# Patient Record
Sex: FEMALE | Race: WHITE | NOT HISPANIC OR LATINO | URBAN - METROPOLITAN AREA
[De-identification: names, ages, dates, MRNs, and addresses within clinical notes are randomized per-mention and may not be internally consistent; named-entity substitution may affect disease eponyms.]

---

## 2017-06-08 ENCOUNTER — INPATIENT (INPATIENT)
Facility: HOSPITAL | Age: 77
LOS: 0 days | Discharge: HOME | End: 2017-06-09
Attending: INTERNAL MEDICINE | Admitting: INTERNAL MEDICINE
Payer: MEDICARE

## 2017-06-08 VITALS
RESPIRATION RATE: 16 BRPM | DIASTOLIC BLOOD PRESSURE: 93 MMHG | WEIGHT: 115.96 LBS | TEMPERATURE: 98 F | OXYGEN SATURATION: 98 % | SYSTOLIC BLOOD PRESSURE: 150 MMHG | HEART RATE: 109 BPM

## 2017-06-08 PROCEDURE — 93010 ELECTROCARDIOGRAM REPORT: CPT

## 2017-06-08 RX ORDER — SODIUM CHLORIDE 9 MG/ML
500 INJECTION INTRAMUSCULAR; INTRAVENOUS; SUBCUTANEOUS ONCE
Qty: 0 | Refills: 0 | Status: COMPLETED | OUTPATIENT
Start: 2017-06-08 | End: 2017-06-08

## 2017-06-08 NOTE — ED PROVIDER NOTE - HEME LYMPH, MLM
No adenopathy or splenomegaly. No cervical or inguinal lymphadenopathy. No adenopathy or splenomegaly.

## 2017-06-08 NOTE — ED PROVIDER NOTE - NS ED MD SCRIBE ATTENDING SCRIBE SECTIONS
REVIEW OF SYSTEMS/PROGRESS NOTE/RESULTS/INTAKE ASSESSMENT/SCREENINGS/PAST MEDICAL/SURGICAL/SOCIAL HISTORY/PHYSICAL EXAM/DISPOSITION

## 2017-06-08 NOTE — ED PROVIDER NOTE - OBJECTIVE STATEMENT
75 y/o female pt  w/ pmhx of HTN and HLD presents to the ED by EMS c/o of radiating pain from back to chest pain starting today. Pt reports taking 4 baby ASA and received nitroglycerin from the ambulence. Pt smokes 1 pack per day. 75 y/o female pt  w/ pmhx of HTN and HLD presents to the ED by EMS c/o of radiating pain from back to chest pain starting today. Pt reports taking 4 baby ASA and received nitroglycerin from the ambulance. Pt smokes 1 pack per day.

## 2017-06-09 VITALS
SYSTOLIC BLOOD PRESSURE: 138 MMHG | HEART RATE: 73 BPM | DIASTOLIC BLOOD PRESSURE: 76 MMHG | RESPIRATION RATE: 20 BRPM | TEMPERATURE: 98 F | OXYGEN SATURATION: 98 %

## 2017-06-09 DIAGNOSIS — Z90.49 ACQUIRED ABSENCE OF OTHER SPECIFIED PARTS OF DIGESTIVE TRACT: Chronic | ICD-10-CM

## 2017-06-09 LAB
ALBUMIN SERPL ELPH-MCNC: 3.6 G/DL — SIGNIFICANT CHANGE UP (ref 3.3–5)
ALP SERPL-CCNC: 65 U/L — SIGNIFICANT CHANGE UP (ref 40–120)
ALT FLD-CCNC: 19 U/L — SIGNIFICANT CHANGE UP (ref 12–78)
ANION GAP SERPL CALC-SCNC: 5 MMOL/L — SIGNIFICANT CHANGE UP (ref 5–17)
ANION GAP SERPL CALC-SCNC: 6 MMOL/L — SIGNIFICANT CHANGE UP (ref 5–17)
APPEARANCE UR: CLEAR — SIGNIFICANT CHANGE UP
APTT BLD: 24.4 SEC — LOW (ref 27.5–37.4)
AST SERPL-CCNC: 16 U/L — SIGNIFICANT CHANGE UP (ref 15–37)
BACTERIA # UR AUTO: (no result)
BASOPHILS # BLD AUTO: 0 K/UL — SIGNIFICANT CHANGE UP (ref 0–0.2)
BASOPHILS NFR BLD AUTO: 0.7 % — SIGNIFICANT CHANGE UP (ref 0–2)
BILIRUB SERPL-MCNC: 0.3 MG/DL — SIGNIFICANT CHANGE UP (ref 0.2–1.2)
BILIRUB UR-MCNC: NEGATIVE — SIGNIFICANT CHANGE UP
BUN SERPL-MCNC: 17 MG/DL — SIGNIFICANT CHANGE UP (ref 7–23)
BUN SERPL-MCNC: 24 MG/DL — HIGH (ref 7–23)
CALCIUM SERPL-MCNC: 8.4 MG/DL — LOW (ref 8.5–10.1)
CALCIUM SERPL-MCNC: 8.5 MG/DL — SIGNIFICANT CHANGE UP (ref 8.5–10.1)
CHLORIDE SERPL-SCNC: 109 MMOL/L — HIGH (ref 96–108)
CHLORIDE SERPL-SCNC: 111 MMOL/L — HIGH (ref 96–108)
CO2 SERPL-SCNC: 27 MMOL/L — SIGNIFICANT CHANGE UP (ref 22–31)
CO2 SERPL-SCNC: 27 MMOL/L — SIGNIFICANT CHANGE UP (ref 22–31)
COLOR SPEC: YELLOW — SIGNIFICANT CHANGE UP
COMMENT - URINE: SIGNIFICANT CHANGE UP
CREAT SERPL-MCNC: 0.71 MG/DL — SIGNIFICANT CHANGE UP (ref 0.5–1.3)
CREAT SERPL-MCNC: 0.86 MG/DL — SIGNIFICANT CHANGE UP (ref 0.5–1.3)
DIFF PNL FLD: NEGATIVE — SIGNIFICANT CHANGE UP
EOSINOPHIL # BLD AUTO: 0.1 K/UL — SIGNIFICANT CHANGE UP (ref 0–0.5)
EOSINOPHIL NFR BLD AUTO: 1.5 % — SIGNIFICANT CHANGE UP (ref 0–6)
EPI CELLS # UR: SIGNIFICANT CHANGE UP
GLUCOSE SERPL-MCNC: 104 MG/DL — HIGH (ref 70–99)
GLUCOSE SERPL-MCNC: 97 MG/DL — SIGNIFICANT CHANGE UP (ref 70–99)
GLUCOSE UR QL: NEGATIVE MG/DL — SIGNIFICANT CHANGE UP
HCT VFR BLD CALC: 34.9 % — SIGNIFICANT CHANGE UP (ref 34.5–45)
HGB BLD-MCNC: 11.7 G/DL — SIGNIFICANT CHANGE UP (ref 11.5–15.5)
INR BLD: 0.92 RATIO — SIGNIFICANT CHANGE UP (ref 0.88–1.16)
KETONES UR-MCNC: NEGATIVE — SIGNIFICANT CHANGE UP
LEUKOCYTE ESTERASE UR-ACNC: (no result)
LYMPHOCYTES # BLD AUTO: 1.7 K/UL — SIGNIFICANT CHANGE UP (ref 1–3.3)
LYMPHOCYTES # BLD AUTO: 26.8 % — SIGNIFICANT CHANGE UP (ref 13–44)
MCHC RBC-ENTMCNC: 33.2 PG — SIGNIFICANT CHANGE UP (ref 27–34)
MCHC RBC-ENTMCNC: 33.6 GM/DL — SIGNIFICANT CHANGE UP (ref 32–36)
MCV RBC AUTO: 98.9 FL — SIGNIFICANT CHANGE UP (ref 80–100)
MONOCYTES # BLD AUTO: 0.7 K/UL — SIGNIFICANT CHANGE UP (ref 0–0.9)
MONOCYTES NFR BLD AUTO: 10.6 % — SIGNIFICANT CHANGE UP (ref 2–14)
NEUTROPHILS # BLD AUTO: 3.9 K/UL — SIGNIFICANT CHANGE UP (ref 1.8–7.4)
NEUTROPHILS NFR BLD AUTO: 60.4 % — SIGNIFICANT CHANGE UP (ref 43–77)
NITRITE UR-MCNC: NEGATIVE — SIGNIFICANT CHANGE UP
PH UR: 5 — SIGNIFICANT CHANGE UP (ref 5–8)
PLATELET # BLD AUTO: 187 K/UL — SIGNIFICANT CHANGE UP (ref 150–400)
POTASSIUM SERPL-MCNC: 3.7 MMOL/L — SIGNIFICANT CHANGE UP (ref 3.5–5.3)
POTASSIUM SERPL-MCNC: 3.7 MMOL/L — SIGNIFICANT CHANGE UP (ref 3.5–5.3)
POTASSIUM SERPL-SCNC: 3.7 MMOL/L — SIGNIFICANT CHANGE UP (ref 3.5–5.3)
POTASSIUM SERPL-SCNC: 3.7 MMOL/L — SIGNIFICANT CHANGE UP (ref 3.5–5.3)
PROT SERPL-MCNC: 6.7 GM/DL — SIGNIFICANT CHANGE UP (ref 6–8.3)
PROT UR-MCNC: 15 MG/DL
PROTHROM AB SERPL-ACNC: 9.9 SEC — SIGNIFICANT CHANGE UP (ref 9.8–12.7)
RBC # BLD: 3.53 M/UL — LOW (ref 3.8–5.2)
RBC # FLD: 11.9 % — SIGNIFICANT CHANGE UP (ref 10.3–14.5)
RBC CASTS # UR COMP ASSIST: SIGNIFICANT CHANGE UP /HPF (ref 0–4)
SODIUM SERPL-SCNC: 142 MMOL/L — SIGNIFICANT CHANGE UP (ref 135–145)
SODIUM SERPL-SCNC: 143 MMOL/L — SIGNIFICANT CHANGE UP (ref 135–145)
SP GR SPEC: 1.01 — SIGNIFICANT CHANGE UP (ref 1.01–1.02)
TROPONIN I SERPL-MCNC: <0.015 NG/ML — SIGNIFICANT CHANGE UP (ref 0.01–0.04)
UROBILINOGEN FLD QL: NEGATIVE MG/DL — SIGNIFICANT CHANGE UP
WBC # BLD: 6.4 K/UL — SIGNIFICANT CHANGE UP (ref 3.8–10.5)
WBC # FLD AUTO: 6.4 K/UL — SIGNIFICANT CHANGE UP (ref 3.8–10.5)
WBC UR QL: (no result)

## 2017-06-09 PROCEDURE — 99285 EMERGENCY DEPT VISIT HI MDM: CPT

## 2017-06-09 PROCEDURE — 71010: CPT | Mod: 26

## 2017-06-09 PROCEDURE — 76856 US EXAM PELVIC COMPLETE: CPT | Mod: 26

## 2017-06-09 PROCEDURE — 74174 CTA ABD&PLVS W/CONTRAST: CPT | Mod: 26

## 2017-06-09 PROCEDURE — 71275 CT ANGIOGRAPHY CHEST: CPT | Mod: 26

## 2017-06-09 RX ORDER — ASPIRIN/CALCIUM CARB/MAGNESIUM 324 MG
325 TABLET ORAL DAILY
Qty: 0 | Refills: 0 | Status: DISCONTINUED | OUTPATIENT
Start: 2017-06-09 | End: 2017-06-09

## 2017-06-09 RX ORDER — ACETAMINOPHEN 500 MG
650 TABLET ORAL EVERY 6 HOURS
Qty: 0 | Refills: 0 | Status: DISCONTINUED | OUTPATIENT
Start: 2017-06-09 | End: 2017-06-09

## 2017-06-09 RX ORDER — METOPROLOL TARTRATE 50 MG
50 TABLET ORAL EVERY 12 HOURS
Qty: 0 | Refills: 0 | Status: DISCONTINUED | OUTPATIENT
Start: 2017-06-09 | End: 2017-06-09

## 2017-06-09 RX ORDER — LEVOTHYROXINE SODIUM 125 MCG
100 TABLET ORAL DAILY
Qty: 0 | Refills: 0 | Status: DISCONTINUED | OUTPATIENT
Start: 2017-06-09 | End: 2017-06-09

## 2017-06-09 RX ORDER — ENOXAPARIN SODIUM 100 MG/ML
40 INJECTION SUBCUTANEOUS EVERY 24 HOURS
Qty: 0 | Refills: 0 | Status: DISCONTINUED | OUTPATIENT
Start: 2017-06-09 | End: 2017-06-09

## 2017-06-09 RX ORDER — ONDANSETRON 8 MG/1
4 TABLET, FILM COATED ORAL EVERY 6 HOURS
Qty: 0 | Refills: 0 | Status: DISCONTINUED | OUTPATIENT
Start: 2017-06-09 | End: 2017-06-09

## 2017-06-09 RX ADMIN — SODIUM CHLORIDE 500 MILLILITER(S): 9 INJECTION INTRAMUSCULAR; INTRAVENOUS; SUBCUTANEOUS at 00:50

## 2017-06-09 RX ADMIN — Medication 100 MICROGRAM(S): at 09:27

## 2017-06-09 RX ADMIN — Medication 50 MILLIGRAM(S): at 09:27

## 2017-06-09 RX ADMIN — Medication 325 MILLIGRAM(S): at 13:50

## 2017-06-09 NOTE — ED ADULT NURSE REASSESSMENT NOTE - NS ED NURSE REASSESS COMMENT FT1
received pt from DANTE Wade, resting comfortably in bed with rails up, cardiac monitor in place, VSS. Pt denies CP or SOB at this time. Pending admission orders. safety maintained, will continue to monitor.

## 2017-06-09 NOTE — H&P ADULT - NSHPLABSRESULTS_GEN_ALL_CORE
11.7   6.4   )-----------( 187      ( 2017 00:15 )             34.9     2017 00:15    142    |  109    |  24     ----------------------------<  97     3.7     |  27     |  0.86     Ca    8.5        2017 00:15    TPro  6.7    /  Alb  3.6    /  TBili  0.3    /  DBili  x      /  AST  16     /  ALT  19     /  AlkPhos  65     2017 00:15    LIVER FUNCTIONS - ( 2017 00:15 )  Alb: 3.6 g/dL / Pro: 6.7 gm/dL / ALK PHOS: 65 U/L / ALT: 19 U/L / AST: 16 U/L / GGT: x           PT/INR - ( 2017 00:15 )   PT: 9.9 sec;   INR: 0.92 ratio         PTT - ( 2017 00:15 )  PTT:24.4 sec  CAPILLARY BLOOD GLUCOSE    CARDIAC MARKERS ( 2017 02:43 )  <0.015 ng/mL / x     / x     / x     / x      CARDIAC MARKERS ( 2017 00:15 )  <0.015 ng/mL / x     / x     / x     / x          Urinalysis Basic - ( 2017 04:48 )    Color: Yellow / Appearance: Clear / S.010 / pH: x  Gluc: x / Ketone: Negative  / Bili: Negative / Urobili: Negative mg/dL   Blood: x / Protein: 15 mg/dL / Nitrite: Negative   Leuk Esterase: Moderate / RBC: 0-2 /HPF / WBC 26-50   Sq Epi: x / Non Sq Epi: Few / Bacteria: Few    EKG: SR at 90, occassional PVCs

## 2017-06-09 NOTE — DISCHARGE NOTE ADULT - CARE PROVIDER_API CALL
Adam Carlson), Cardiology; Internal Medicine  [address] [place] [address]  Phone: [phone]  Fax: [phone]    Teresa Myers), Internal Medicine  [address] [place] [address]  Phone: [phone]  Fax: [phone]

## 2017-06-09 NOTE — DISCHARGE NOTE ADULT - PLAN OF CARE
follow up with DR Carlson for outpatient cardiac work up. Follow up with your PMD for right adnexal cystic lesion. 2 gm Na diet

## 2017-06-09 NOTE — ED ADULT NURSE REASSESSMENT NOTE - NS ED NURSE REASSESS COMMENT FT1
Rec'd call from Dr. Alexis, pt may be dc'd if she's cp free and has 2 or 3 neg tropes.  Updated pt.  Notified Dr. Watkins who stated to obtain 3rd trop now.

## 2017-06-09 NOTE — H&P ADULT - ASSESSMENT
76 year old female with H/O HTN, hypothyroidism, anxiety, ?right lung nodule resection due to cancer presented to ED with chest pain. As per patient, her pain started in the back and then radiated to the anterior chest, 10/10 intensity. Patient then took 4 baby aspirin. She also received SL nitro by EMS. She denies any sob, nausea, vomiting, fever or chills. No prior similar episodes.      1. Chest pain-  R/O ACS  Admit to tele  2 sets of cardiac enzymes are negative  Check another sets of CE  Continue aspirin  Consult cardiology  Consider inpatient stress test.      2. HTN-stable  Continue lopressor-patient cannot remember dose    3. Right adnexal cystic lesion-  Check pelvic US    4. Depression-  Continue zoloft-cannot remember dose.

## 2017-06-09 NOTE — DISCHARGE NOTE ADULT - HOSPITAL COURSE
76 year old female with H/O HTN, hypothyroidism, anxiety, ?right lung nodule resection due to cancer presented to ED with chest pain. As per patient, her pain started in the back and then radiated to the anterior chest, 10/10 intensity. Patient then took 4 baby aspirin. She also received SL nitro by EMS. She denies any sob, nausea, vomiting, fever or chills. No prior similar episodes.      Patient denies any more chest pain. She was notified regarding right adnexal lesion to follow up with her PMD. 3 sets of CE are negative. Outpatient cardiac follow up    Physical Exam:  Physical Exam: Vital Signs Last 24 Hrs T(C): 37.2, Max: 37.2 (06-09 @ 04:51) T(F): 98.9, Max: 98.9 (06-09 @ 04:51) HR: 83 (83 - 109) BP: 152/80 (136/78 - 152/80) BP(mean): -- RR: 16 (16 - 16) SpO2: 99% (98% - 99%)      · CONSTITUTIONAL: Well appearing, well nourished, awake, alert, oriented to person, place, time/situation and in no apparent distress. · ENMT: Airway patent, Nasal mucosa clear. Mouth with normal mucosa. Throat has no vesicles, no oropharyngeal exudates and uvula is midline. · HEAD: Head atraumatic, normal cephalic shape. · HEAD: Head is atraumatic. Head shape is symmetrical. · EYES: Clear bilaterally, pupils equal, round and reactive to light. · CARDIAC: Normal rate, regular rhythm.  Heart sounds S1, S2.  No murmurs, rubs or gallops. · RESPIRATORY: Breath sounds clear and equal bilaterally. · GASTROINTESTINAL: Abdomen soft, non-tender, no guarding. · MUSCULOSKELETAL: Spine appears normal, range of motion is not limited, no muscle or joint tenderness · NEUROLOGICAL: Alert and oriented, no focal deficits, no motor or sensory deficits. · SKIN: Skin normal color for race, warm, dry and intact. No evidence of rash. · PSYCHIATRIC: Alert and oriented to person, place, time/situation. normal mood and affect. no apparent risk to self or others. · HEME LYMPH: No adenopathy or splenomegaly.	        1. Chest pain-  Ruled out ACS  3 sets of cardiac enzymes are negative  Consult cardiology- RN discussed with Dr Carlson, as per him can be discharged home once 3 sets are negative  Out patient cardiac work up      2. HTN-stable  Continue lopressor    3. Right adnexal cystic lesion-  Pelvic US: right adnexal cystic lesion  Follow up with your PMD

## 2017-06-09 NOTE — ED ADULT NURSE REASSESSMENT NOTE - NS ED NURSE REASSESS COMMENT FT1
pt ambulated to restroom with assist. cardiac monitoring remains in place. safety maintained, will continue to monitor.

## 2017-06-09 NOTE — ED ADULT NURSE REASSESSMENT NOTE - NS ED NURSE REASSESS COMMENT FT1
Pt is an A&O x 4 female presents to ED c/o CP x 30 minutes PTA which started in her back and radiated to chest. EKG completed. Placed on cardiac monitor. SL initiated, labs drawn. IVF infusing. Awaiting CT scan at this time. Will continue to monitor.

## 2017-06-09 NOTE — DISCHARGE NOTE ADULT - MEDICATION SUMMARY - MEDICATIONS TO TAKE
I will START or STAY ON the medications listed below when I get home from the hospital:    Zoloft  --  by mouth   -- Indication: For anxiety    hydroCHLOROthiazide-losartan  --  by mouth   -- Indication: For HTN (hypertension)    metoprolol  --  by mouth   -- Indication: For HTN (hypertension)    Synthroid  --  by mouth   -- Indication: For Hypothyroidism, unspecified type

## 2017-06-09 NOTE — DISCHARGE NOTE ADULT - CARE PLAN
Principal Discharge DX:	Chest pain, unspecified type  Goal:	follow up with DR Carlson for outpatient cardiac work up. Follow up with your PMD for right adnexal cystic lesion.  Instructions for follow-up, activity and diet:	2 gm Na diet

## 2017-06-09 NOTE — DISCHARGE NOTE ADULT - PATIENT PORTAL LINK FT
“You can access the Follow[facility] Patient Portal, offered by [facility], by registering with the following website: [email or web address]”

## 2017-06-09 NOTE — DISCHARGE NOTE ADULT - CARE PROVIDERS DIRECT ADDRESSES
[email or web address]

## 2017-06-09 NOTE — ED ADULT NURSE REASSESSMENT NOTE - NS ED NURSE REASSESS COMMENT FT1
Updated pt and , just spoke via phone w Dr. Watkins, 3rd trop to be drawn & [place] to be consulted then dc to be considered.

## 2017-06-09 NOTE — H&P ADULT - HISTORY OF PRESENT ILLNESS
76 year old female with H/O HTN, hypothyroidism, anxiety, ?right lung nodule resection due to cancer presented to ED with chest pain. As per patient, her pain started in the back and then radiated to the anterior chest, 10/10 intensity. Patient then took 4 baby aspirin. She also received SL nitro by EMS. She denies any sob, nausea, vomiting, fever or chills. No prior similar episodes.

## 2017-06-09 NOTE — H&P ADULT - NSHPPHYSICALEXAM_GEN_ALL_CORE
Vital Signs Last 24 Hrs  T(C): 37.2, Max: 37.2 (06-09 @ 04:51)  T(F): 98.9, Max: 98.9 (06-09 @ 04:51)  HR: 83 (83 - 109)  BP: 152/80 (136/78 - 152/80)  BP(mean): --  RR: 16 (16 - 16)  SpO2: 99% (98% - 99%)            · CONSTITUTIONAL: Well appearing, well nourished, awake, alert, oriented to person, place, time/situation and in no apparent distress.  · ENMT: Airway patent, Nasal mucosa clear. Mouth with normal mucosa. Throat has no vesicles, no oropharyngeal exudates and uvula is midline.  · HEAD: Head atraumatic, normal cephalic shape.  · HEAD: Head is atraumatic. Head shape is symmetrical.  · EYES: Clear bilaterally, pupils equal, round and reactive to light.  · CARDIAC: Normal rate, regular rhythm.  Heart sounds S1, S2.  No murmurs, rubs or gallops.  · RESPIRATORY: Breath sounds clear and equal bilaterally.  · GASTROINTESTINAL: Abdomen soft, non-tender, no guarding.  · MUSCULOSKELETAL: Spine appears normal, range of motion is not limited, no muscle or joint tenderness  · NEUROLOGICAL: Alert and oriented, no focal deficits, no motor or sensory deficits.  · SKIN: Skin normal color for race, warm, dry and intact. No evidence of rash.  · PSYCHIATRIC: Alert and oriented to person, place, time/situation. normal mood and affect. no apparent risk to self or others.  · HEME LYMPH: No adenopathy or splenomegaly.

## 2017-06-10 ENCOUNTER — EMERGENCY (EMERGENCY)
Facility: HOSPITAL | Age: 77
LOS: 0 days | Discharge: HOME | End: 2017-06-11
Attending: EMERGENCY MEDICINE | Admitting: EMERGENCY MEDICINE
Payer: MEDICARE

## 2017-06-10 VITALS
HEIGHT: 63 IN | DIASTOLIC BLOOD PRESSURE: 95 MMHG | OXYGEN SATURATION: 100 % | TEMPERATURE: 98 F | RESPIRATION RATE: 18 BRPM | HEART RATE: 106 BPM | WEIGHT: 115.08 LBS | SYSTOLIC BLOOD PRESSURE: 156 MMHG
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
## 2017-06-10 DIAGNOSIS — S02.80XA FRACTURE OF OTHER SPECIFIED SKULL AND FACIAL BONES, UNSPECIFIED SIDE, INITIAL ENCOUNTER FOR CLOSED FRACTURE: ICD-10-CM

## 2017-06-10 DIAGNOSIS — Z90.49 ACQUIRED ABSENCE OF OTHER SPECIFIED PARTS OF DIGESTIVE TRACT: Chronic | ICD-10-CM

## 2017-06-10 DIAGNOSIS — E78.5 HYPERLIPIDEMIA, UNSPECIFIED: ICD-10-CM

## 2017-06-10 DIAGNOSIS — S01.412A LACERATION WITHOUT FOREIGN BODY OF LEFT CHEEK AND TEMPOROMANDIBULAR AREA, INITIAL ENCOUNTER: ICD-10-CM
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
## 2017-06-10 DIAGNOSIS — S01.511A LACERATION WITHOUT FOREIGN BODY OF LIP, INITIAL ENCOUNTER: ICD-10-CM

## 2017-06-10 DIAGNOSIS — I10 ESSENTIAL (PRIMARY) HYPERTENSION: ICD-10-CM

## 2017-06-10 DIAGNOSIS — S09.90XA UNSPECIFIED INJURY OF HEAD, INITIAL ENCOUNTER: ICD-10-CM

## 2017-06-10 LAB
ALBUMIN SERPL ELPH-MCNC: 4 G/DL — SIGNIFICANT CHANGE UP (ref 3.3–5)
ALP SERPL-CCNC: 66 U/L — SIGNIFICANT CHANGE UP (ref 40–120)
ALT FLD-CCNC: 25 U/L — SIGNIFICANT CHANGE UP (ref 12–78)
ANION GAP SERPL CALC-SCNC: 9 MMOL/L — SIGNIFICANT CHANGE UP (ref 5–17)
APTT BLD: 22.2 SEC — LOW (ref 27.5–37.4)
AST SERPL-CCNC: 19 U/L — SIGNIFICANT CHANGE UP (ref 15–37)
BASOPHILS # BLD AUTO: 0.1 K/UL — SIGNIFICANT CHANGE UP (ref 0–0.2)
BASOPHILS NFR BLD AUTO: 0.7 % — SIGNIFICANT CHANGE UP (ref 0–2)
BILIRUB SERPL-MCNC: 0.3 MG/DL — SIGNIFICANT CHANGE UP (ref 0.2–1.2)
BUN SERPL-MCNC: 20 MG/DL — SIGNIFICANT CHANGE UP (ref 7–23)
CALCIUM SERPL-MCNC: 9 MG/DL — SIGNIFICANT CHANGE UP (ref 8.5–10.1)
CHLORIDE SERPL-SCNC: 110 MMOL/L — HIGH (ref 96–108)
CO2 SERPL-SCNC: 27 MMOL/L — SIGNIFICANT CHANGE UP (ref 22–31)
CREAT SERPL-MCNC: 0.86 MG/DL — SIGNIFICANT CHANGE UP (ref 0.5–1.3)
CULTURE RESULTS: SIGNIFICANT CHANGE UP
EOSINOPHIL # BLD AUTO: 0.1 K/UL — SIGNIFICANT CHANGE UP (ref 0–0.5)
EOSINOPHIL NFR BLD AUTO: 1.5 % — SIGNIFICANT CHANGE UP (ref 0–6)
ETHANOL SERPL-MCNC: <10 MG/DL — SIGNIFICANT CHANGE UP (ref 0–10)
GLUCOSE SERPL-MCNC: 101 MG/DL — HIGH (ref 70–99)
HCT VFR BLD CALC: 35.4 % — SIGNIFICANT CHANGE UP (ref 34.5–45)
HGB BLD-MCNC: 12.6 G/DL — SIGNIFICANT CHANGE UP (ref 11.5–15.5)
INR BLD: 0.99 RATIO — SIGNIFICANT CHANGE UP (ref 0.88–1.16)
LYMPHOCYTES # BLD AUTO: 1.9 K/UL — SIGNIFICANT CHANGE UP (ref 1–3.3)
LYMPHOCYTES # BLD AUTO: 22.7 % — SIGNIFICANT CHANGE UP (ref 13–44)
MCHC RBC-ENTMCNC: 34.2 PG — HIGH (ref 27–34)
MCHC RBC-ENTMCNC: 35.4 GM/DL — SIGNIFICANT CHANGE UP (ref 32–36)
MCV RBC AUTO: 96.5 FL — SIGNIFICANT CHANGE UP (ref 80–100)
MONOCYTES # BLD AUTO: 0.7 K/UL — SIGNIFICANT CHANGE UP (ref 0–0.9)
MONOCYTES NFR BLD AUTO: 8 % — SIGNIFICANT CHANGE UP (ref 2–14)
NEUTROPHILS # BLD AUTO: 5.5 K/UL — SIGNIFICANT CHANGE UP (ref 1.8–7.4)
NEUTROPHILS NFR BLD AUTO: 67.1 % — SIGNIFICANT CHANGE UP (ref 43–77)
PLATELET # BLD AUTO: 198 K/UL — SIGNIFICANT CHANGE UP (ref 150–400)
POTASSIUM SERPL-MCNC: 4.1 MMOL/L — SIGNIFICANT CHANGE UP (ref 3.5–5.3)
POTASSIUM SERPL-SCNC: 4.1 MMOL/L — SIGNIFICANT CHANGE UP (ref 3.5–5.3)
PROT SERPL-MCNC: 7.4 GM/DL — SIGNIFICANT CHANGE UP (ref 6–8.3)
PROTHROM AB SERPL-ACNC: 10.7 SEC — SIGNIFICANT CHANGE UP (ref 9.8–12.7)
RBC # BLD: 3.67 M/UL — LOW (ref 3.8–5.2)
RBC # FLD: 11.8 % — SIGNIFICANT CHANGE UP (ref 10.3–14.5)
SODIUM SERPL-SCNC: 146 MMOL/L — HIGH (ref 135–145)
SPECIMEN SOURCE: SIGNIFICANT CHANGE UP
TROPONIN I SERPL-MCNC: <0.015 NG/ML — SIGNIFICANT CHANGE UP (ref 0.01–0.04)
WBC # BLD: 8.2 K/UL — SIGNIFICANT CHANGE UP (ref 3.8–10.5)
WBC # FLD AUTO: 8.2 K/UL — SIGNIFICANT CHANGE UP (ref 3.8–10.5)

## 2017-06-10 PROCEDURE — 70450 CT HEAD/BRAIN W/O DYE: CPT | Mod: 26

## 2017-06-10 PROCEDURE — 70486 CT MAXILLOFACIAL W/O DYE: CPT | Mod: 26

## 2017-06-10 PROCEDURE — 12011 RPR F/E/E/N/L/M 2.5 CM/<: CPT

## 2017-06-10 PROCEDURE — 72125 CT NECK SPINE W/O DYE: CPT | Mod: 26

## 2017-06-10 PROCEDURE — 71250 CT THORAX DX C-: CPT | Mod: 26

## 2017-06-10 PROCEDURE — 71010: CPT | Mod: 26

## 2017-06-10 PROCEDURE — 76377 3D RENDER W/INTRP POSTPROCES: CPT | Mod: 26

## 2017-06-10 PROCEDURE — 93010 ELECTROCARDIOGRAM REPORT: CPT

## 2017-06-10 PROCEDURE — 99285 EMERGENCY DEPT VISIT HI MDM: CPT | Mod: 25

## 2017-06-10 RX ORDER — MORPHINE SULFATE 50 MG/1
4 CAPSULE, EXTENDED RELEASE ORAL ONCE
Qty: 0 | Refills: 0 | Status: DISCONTINUED | OUTPATIENT
Start: 2017-06-10 | End: 2017-06-10

## 2017-06-10 RX ADMIN — MORPHINE SULFATE 4 MILLIGRAM(S): 50 CAPSULE, EXTENDED RELEASE ORAL at 21:35

## 2017-06-10 RX ADMIN — MORPHINE SULFATE 4 MILLIGRAM(S): 50 CAPSULE, EXTENDED RELEASE ORAL at 21:18

## 2017-06-10 NOTE — ED PROVIDER NOTE - NS ED MD SCRIBE ATTENDING SCRIBE SECTIONS
HISTORY OF PRESENT ILLNESS/REVIEW OF SYSTEMS/PHYSICAL EXAM/RESULTS/PAST MEDICAL/SURGICAL/SOCIAL HISTORY/DISPOSITION/PROGRESS NOTE

## 2017-06-10 NOTE — ED PROVIDER NOTE - CARE PLAN
Principal Discharge DX:	Facial fracture  Secondary Diagnosis:	Facial laceration  Secondary Diagnosis:	Chest wall pain

## 2017-06-10 NOTE — ED PROVIDER NOTE - MEDICAL DECISION MAKING DETAILS
patient unsure if fall was secondary to syncope, she remembers events leading to fall. patient with [place] syncope rule negative. appropriate for outpatient management of facial bone fractures.

## 2017-06-10 NOTE — ED PROVIDER NOTE - OBJECTIVE STATEMENT
77 y/o F with a h/o previous neck surgery, BIBA with C-collar in place, s/p fall from standing height in kitchen minutes PTA, striking right side of face on counter, c/o lacs to corner of right eye and corner or right lip. Pt also c/o right rib pain. Denies alcohol use.

## 2017-06-10 NOTE — ED PROVIDER NOTE - PROGRESS NOTE DETAILS
case discussed with Dr Pretty of plastic surgery, no acute surgical intervention for facial bone fractures, patient can f/u as an outpatient for facial bone fracture management. procedure note: right face laceration- betadine applied, I anesthetized the wound with 1% lido with epi, irrigated with sterile water. 5-0 prolene sutures used for wound closure simple interrupted. right lip laceration - betadine applied, anesthetized with 1% lidocaine with epi, 5- vicryl for wound closure simple interrupted. patient tolerated procedure well. procedure note: right face laceration- betadine applied, I anesthetized the wound with 1% lido with epi, irrigated with sterile water. 5-0 prolene sutures used for wound closure simple interrupted. right lip laceration - betadine applied, anesthetized with 1% lidocaine with epi, 5-0 vicryl for wound closure simple interrupted. patient tolerated procedure well.

## 2017-06-11 PROBLEM — I10 ESSENTIAL (PRIMARY) HYPERTENSION: Chronic | Status: ACTIVE | Noted: 2017-06-09

## 2017-06-16 DIAGNOSIS — F41.9 ANXIETY DISORDER, UNSPECIFIED: ICD-10-CM

## 2017-06-16 DIAGNOSIS — I10 ESSENTIAL (PRIMARY) HYPERTENSION: ICD-10-CM

## 2017-06-16 DIAGNOSIS — R91.1 SOLITARY PULMONARY NODULE: ICD-10-CM

## 2017-06-16 DIAGNOSIS — F32.9 MAJOR DEPRESSIVE DISORDER, SINGLE EPISODE, UNSPECIFIED: ICD-10-CM

## 2017-06-16 DIAGNOSIS — R07.9 CHEST PAIN, UNSPECIFIED: ICD-10-CM

## 2017-06-16 DIAGNOSIS — E03.9 HYPOTHYROIDISM, UNSPECIFIED: ICD-10-CM

## 2017-06-16 DIAGNOSIS — N83.201 UNSPECIFIED OVARIAN CYST, RIGHT SIDE: ICD-10-CM

## 2017-08-24 ENCOUNTER — OUTPATIENT (OUTPATIENT)
Dept: OUTPATIENT SERVICES | Facility: HOSPITAL | Age: 77
LOS: 1 days | Discharge: HOME | End: 2017-08-24
Payer: MEDICARE

## 2017-08-24 DIAGNOSIS — C34.81 MALIGNANT NEOPLASM OF OVERLAPPING SITES OF RIGHT BRONCHUS AND LUNG: ICD-10-CM

## 2017-08-24 DIAGNOSIS — C34.02 MALIGNANT NEOPLASM OF LEFT MAIN BRONCHUS: ICD-10-CM

## 2017-08-24 DIAGNOSIS — Z01.818 ENCOUNTER FOR OTHER PREPROCEDURAL EXAMINATION: ICD-10-CM

## 2017-08-24 DIAGNOSIS — Z90.49 ACQUIRED ABSENCE OF OTHER SPECIFIED PARTS OF DIGESTIVE TRACT: Chronic | ICD-10-CM

## 2017-08-24 DIAGNOSIS — Z98.890 OTHER SPECIFIED POSTPROCEDURAL STATES: Chronic | ICD-10-CM

## 2017-08-24 DIAGNOSIS — Z98.1 ARTHRODESIS STATUS: Chronic | ICD-10-CM

## 2017-08-24 DIAGNOSIS — E83.110 HEREDITARY HEMOCHROMATOSIS: ICD-10-CM

## 2017-08-24 DIAGNOSIS — C79.51 SECONDARY MALIGNANT NEOPLASM OF BONE: ICD-10-CM

## 2017-08-24 DIAGNOSIS — E03.9 HYPOTHYROIDISM, UNSPECIFIED: ICD-10-CM

## 2017-08-24 DIAGNOSIS — Z87.891 PERSONAL HISTORY OF NICOTINE DEPENDENCE: ICD-10-CM

## 2017-08-24 DIAGNOSIS — Z90.721 ACQUIRED ABSENCE OF OVARIES, UNILATERAL: Chronic | ICD-10-CM

## 2017-08-24 DIAGNOSIS — Z79.82 LONG TERM (CURRENT) USE OF ASPIRIN: ICD-10-CM

## 2017-08-24 DIAGNOSIS — I10 ESSENTIAL (PRIMARY) HYPERTENSION: ICD-10-CM

## 2017-08-24 LAB
ALBUMIN SERPL ELPH-MCNC: 3.7 G/DL — SIGNIFICANT CHANGE UP (ref 3.3–5)
ALP SERPL-CCNC: 91 U/L — SIGNIFICANT CHANGE UP (ref 40–120)
ALT FLD-CCNC: 13 U/L — SIGNIFICANT CHANGE UP (ref 12–78)
ANION GAP SERPL CALC-SCNC: 7 MMOL/L — SIGNIFICANT CHANGE UP (ref 5–17)
APPEARANCE UR: CLEAR — SIGNIFICANT CHANGE UP
AST SERPL-CCNC: 10 U/L — LOW (ref 15–37)
BACTERIA # UR AUTO: (no result)
BASOPHILS # BLD AUTO: 0 K/UL — SIGNIFICANT CHANGE UP (ref 0–0.2)
BASOPHILS NFR BLD AUTO: 0.6 % — SIGNIFICANT CHANGE UP (ref 0–2)
BILIRUB SERPL-MCNC: 0.4 MG/DL — SIGNIFICANT CHANGE UP (ref 0.2–1.2)
BILIRUB UR-MCNC: NEGATIVE — SIGNIFICANT CHANGE UP
BUN SERPL-MCNC: 18 MG/DL — SIGNIFICANT CHANGE UP (ref 7–23)
CALCIUM SERPL-MCNC: 9.2 MG/DL — SIGNIFICANT CHANGE UP (ref 8.5–10.1)
CHLORIDE SERPL-SCNC: 104 MMOL/L — SIGNIFICANT CHANGE UP (ref 96–108)
CO2 SERPL-SCNC: 28 MMOL/L — SIGNIFICANT CHANGE UP (ref 22–31)
COLOR SPEC: YELLOW — SIGNIFICANT CHANGE UP
CREAT SERPL-MCNC: 0.88 MG/DL — SIGNIFICANT CHANGE UP (ref 0.5–1.3)
DIFF PNL FLD: NEGATIVE — SIGNIFICANT CHANGE UP
EOSINOPHIL # BLD AUTO: 0.3 K/UL — SIGNIFICANT CHANGE UP (ref 0–0.5)
EOSINOPHIL NFR BLD AUTO: 4.4 % — SIGNIFICANT CHANGE UP (ref 0–6)
GLUCOSE SERPL-MCNC: 95 MG/DL — SIGNIFICANT CHANGE UP (ref 70–99)
GLUCOSE UR QL: NEGATIVE MG/DL — SIGNIFICANT CHANGE UP
HCT VFR BLD CALC: 34.4 % — LOW (ref 34.5–45)
HGB BLD-MCNC: 11.7 G/DL — SIGNIFICANT CHANGE UP (ref 11.5–15.5)
KETONES UR-MCNC: (no result)
LEUKOCYTE ESTERASE UR-ACNC: (no result)
LYMPHOCYTES # BLD AUTO: 1.8 K/UL — SIGNIFICANT CHANGE UP (ref 1–3.3)
LYMPHOCYTES # BLD AUTO: 25.4 % — SIGNIFICANT CHANGE UP (ref 13–44)
MCHC RBC-ENTMCNC: 33.3 PG — SIGNIFICANT CHANGE UP (ref 27–34)
MCHC RBC-ENTMCNC: 34 GM/DL — SIGNIFICANT CHANGE UP (ref 32–36)
MCV RBC AUTO: 97.9 FL — SIGNIFICANT CHANGE UP (ref 80–100)
MONOCYTES # BLD AUTO: 1 K/UL — HIGH (ref 0–0.9)
MONOCYTES NFR BLD AUTO: 14 % — SIGNIFICANT CHANGE UP (ref 2–14)
NEUTROPHILS # BLD AUTO: 3.9 K/UL — SIGNIFICANT CHANGE UP (ref 1.8–7.4)
NEUTROPHILS NFR BLD AUTO: 55.7 % — SIGNIFICANT CHANGE UP (ref 43–77)
NITRITE UR-MCNC: NEGATIVE — SIGNIFICANT CHANGE UP
PH UR: 5 — SIGNIFICANT CHANGE UP (ref 5–8)
PLATELET # BLD AUTO: 263 K/UL — SIGNIFICANT CHANGE UP (ref 150–400)
POTASSIUM SERPL-MCNC: 4 MMOL/L — SIGNIFICANT CHANGE UP (ref 3.5–5.3)
POTASSIUM SERPL-SCNC: 4 MMOL/L — SIGNIFICANT CHANGE UP (ref 3.5–5.3)
PROT SERPL-MCNC: 7.5 GM/DL — SIGNIFICANT CHANGE UP (ref 6–8.3)
PROT UR-MCNC: 15 MG/DL
RBC # BLD: 3.51 M/UL — LOW (ref 3.8–5.2)
RBC # FLD: 11.4 % — SIGNIFICANT CHANGE UP (ref 10.3–14.5)
RBC CASTS # UR COMP ASSIST: NEGATIVE /HPF — SIGNIFICANT CHANGE UP (ref 0–4)
SODIUM SERPL-SCNC: 139 MMOL/L — SIGNIFICANT CHANGE UP (ref 135–145)
SP GR SPEC: 1.01 — SIGNIFICANT CHANGE UP (ref 1.01–1.02)
UROBILINOGEN FLD QL: NEGATIVE MG/DL — SIGNIFICANT CHANGE UP
WBC # BLD: 7 K/UL — SIGNIFICANT CHANGE UP (ref 3.8–10.5)
WBC # FLD AUTO: 7 K/UL — SIGNIFICANT CHANGE UP (ref 3.8–10.5)
WBC UR QL: SIGNIFICANT CHANGE UP

## 2017-08-24 PROCEDURE — 93010 ELECTROCARDIOGRAM REPORT: CPT

## 2017-08-24 RX ORDER — SERTRALINE 25 MG/1
0 TABLET, FILM COATED ORAL
Qty: 0 | Refills: 0 | COMMUNITY

## 2017-08-24 RX ORDER — LOSARTAN/HYDROCHLOROTHIAZIDE 100MG-25MG
0 TABLET ORAL
Qty: 0 | Refills: 0 | COMMUNITY

## 2017-08-24 RX ORDER — LEVOTHYROXINE SODIUM 125 MCG
0 TABLET ORAL
Qty: 0 | Refills: 0 | COMMUNITY

## 2017-08-24 RX ORDER — METOPROLOL TARTRATE 50 MG
0 TABLET ORAL
Qty: 0 | Refills: 0 | COMMUNITY

## 2017-08-24 NOTE — CHART NOTE - NSCHARTNOTEFT_GEN_A_CORE
Plan  1. Stop all NSAIDS, herbal supplements and vitamins for 7 days.  2. NPO at midnight.  3. Take the following medications ( ) with small sips of water on the morning of your procedure/surgery.  4. Use EZ sponges as directed  5. Use mupirocin as directed Plan  1. Stop all NSAIDS, herbal supplements and vitamins for 7 days.  2. NPO at midnight.  3. Take the following medications (synthroid) with small sips of water on the morning of your procedure/surgery.  4. Use EZ sponges as directed  5. Use mupirocin as directed

## 2017-08-24 NOTE — ASU PATIENT PROFILE, ADULT - PSH
H/O oophorectomy    H/O spinal fusion  anterior cervical fusion 2x   15 yrs ago, 2014  H/O umbilical hernia repair    History of lung surgery  right lung nodules removed 2015  S/P breast lumpectomy H/O oophorectomy    H/O spinal fusion  anterior cervical fusion 2x   15 yrs ago, 2014  H/O umbilical hernia repair    History of facial surgery  right cheekbone surgery - 06/2017  History of lung surgery  right lung nodules removed 2015  S/P breast lumpectomy

## 2017-08-24 NOTE — ASU PATIENT PROFILE, ADULT - PMH
Hemochromatosis    HTN (hypertension)    HTN (hypertension)    Hypothyroidism    Hypothyroidism, unspecified type    Intervertebral disc disorder  S/P cervical fusion  Lung cancer    Macular degeneration  left eye  Osteoporosis HTN (hypertension)    Hypothyroidism, unspecified type    Intervertebral disc disorder  S/P cervical fusion  Lung cancer    Macular degeneration  left eye  Osteoporosis    Vitamin B12 deficiency

## 2017-08-25 LAB
MRSA PCR RESULT.: SIGNIFICANT CHANGE UP
S AUREUS DNA NOSE QL NAA+PROBE: SIGNIFICANT CHANGE UP

## 2017-09-15 ENCOUNTER — OUTPATIENT (OUTPATIENT)
Dept: OUTPATIENT SERVICES | Facility: HOSPITAL | Age: 77
LOS: 1 days | Discharge: HOME | End: 2017-09-15

## 2017-09-15 DIAGNOSIS — Z98.890 OTHER SPECIFIED POSTPROCEDURAL STATES: Chronic | ICD-10-CM

## 2017-09-15 DIAGNOSIS — C34.81 MALIGNANT NEOPLASM OF OVERLAPPING SITES OF RIGHT BRONCHUS AND LUNG: ICD-10-CM

## 2017-09-15 DIAGNOSIS — Z01.818 ENCOUNTER FOR OTHER PREPROCEDURAL EXAMINATION: ICD-10-CM

## 2017-09-15 DIAGNOSIS — Z98.1 ARTHRODESIS STATUS: Chronic | ICD-10-CM

## 2017-09-15 DIAGNOSIS — Z90.721 ACQUIRED ABSENCE OF OVARIES, UNILATERAL: Chronic | ICD-10-CM

## 2017-09-15 LAB
ALBUMIN SERPL ELPH-MCNC: 3.8 G/DL — SIGNIFICANT CHANGE UP (ref 3.3–5)
ALP SERPL-CCNC: 107 U/L — SIGNIFICANT CHANGE UP (ref 40–120)
ALT FLD-CCNC: 12 U/L — SIGNIFICANT CHANGE UP (ref 12–78)
ANION GAP SERPL CALC-SCNC: 5 MMOL/L — SIGNIFICANT CHANGE UP (ref 5–17)
APPEARANCE UR: CLEAR — SIGNIFICANT CHANGE UP
AST SERPL-CCNC: 15 U/L — SIGNIFICANT CHANGE UP (ref 15–37)
BACTERIA # UR AUTO: (no result)
BASOPHILS # BLD AUTO: 0 K/UL — SIGNIFICANT CHANGE UP (ref 0–0.2)
BASOPHILS NFR BLD AUTO: 0.4 % — SIGNIFICANT CHANGE UP (ref 0–2)
BILIRUB SERPL-MCNC: 0.4 MG/DL — SIGNIFICANT CHANGE UP (ref 0.2–1.2)
BILIRUB UR-MCNC: NEGATIVE — SIGNIFICANT CHANGE UP
BUN SERPL-MCNC: 15 MG/DL — SIGNIFICANT CHANGE UP (ref 7–23)
CALCIUM SERPL-MCNC: 9.2 MG/DL — SIGNIFICANT CHANGE UP (ref 8.5–10.1)
CHLORIDE SERPL-SCNC: 106 MMOL/L — SIGNIFICANT CHANGE UP (ref 96–108)
CO2 SERPL-SCNC: 30 MMOL/L — SIGNIFICANT CHANGE UP (ref 22–31)
COLOR SPEC: YELLOW — SIGNIFICANT CHANGE UP
CREAT SERPL-MCNC: 0.76 MG/DL — SIGNIFICANT CHANGE UP (ref 0.5–1.3)
DIFF PNL FLD: (no result)
EOSINOPHIL # BLD AUTO: 0.3 K/UL — SIGNIFICANT CHANGE UP (ref 0–0.5)
EOSINOPHIL NFR BLD AUTO: 4.5 % — SIGNIFICANT CHANGE UP (ref 0–6)
EPI CELLS # UR: SIGNIFICANT CHANGE UP
GLUCOSE SERPL-MCNC: 104 MG/DL — HIGH (ref 70–99)
GLUCOSE UR QL: NEGATIVE MG/DL — SIGNIFICANT CHANGE UP
HCT VFR BLD CALC: 35.4 % — SIGNIFICANT CHANGE UP (ref 34.5–45)
HGB BLD-MCNC: 12.4 G/DL — SIGNIFICANT CHANGE UP (ref 11.5–15.5)
HYALINE CASTS # UR AUTO: (no result) /LPF
KETONES UR-MCNC: NEGATIVE — SIGNIFICANT CHANGE UP
LEUKOCYTE ESTERASE UR-ACNC: (no result)
LYMPHOCYTES # BLD AUTO: 1.5 K/UL — SIGNIFICANT CHANGE UP (ref 1–3.3)
LYMPHOCYTES # BLD AUTO: 24.9 % — SIGNIFICANT CHANGE UP (ref 13–44)
MCHC RBC-ENTMCNC: 33.9 PG — SIGNIFICANT CHANGE UP (ref 27–34)
MCHC RBC-ENTMCNC: 35 GM/DL — SIGNIFICANT CHANGE UP (ref 32–36)
MCV RBC AUTO: 96.7 FL — SIGNIFICANT CHANGE UP (ref 80–100)
MONOCYTES # BLD AUTO: 0.8 K/UL — SIGNIFICANT CHANGE UP (ref 0–0.9)
MONOCYTES NFR BLD AUTO: 12.6 % — SIGNIFICANT CHANGE UP (ref 2–14)
MRSA PCR RESULT.: SIGNIFICANT CHANGE UP
NEUTROPHILS # BLD AUTO: 3.5 K/UL — SIGNIFICANT CHANGE UP (ref 1.8–7.4)
NEUTROPHILS NFR BLD AUTO: 57.6 % — SIGNIFICANT CHANGE UP (ref 43–77)
NITRITE UR-MCNC: POSITIVE
PH UR: 5 — SIGNIFICANT CHANGE UP (ref 5–8)
PLATELET # BLD AUTO: 182 K/UL — SIGNIFICANT CHANGE UP (ref 150–400)
POTASSIUM SERPL-MCNC: 4.1 MMOL/L — SIGNIFICANT CHANGE UP (ref 3.5–5.3)
POTASSIUM SERPL-SCNC: 4.1 MMOL/L — SIGNIFICANT CHANGE UP (ref 3.5–5.3)
PROT SERPL-MCNC: 7.7 GM/DL — SIGNIFICANT CHANGE UP (ref 6–8.3)
PROT UR-MCNC: 15 MG/DL
RBC # BLD: 3.66 M/UL — LOW (ref 3.8–5.2)
RBC # FLD: 11.3 % — SIGNIFICANT CHANGE UP (ref 10.3–14.5)
RBC CASTS # UR COMP ASSIST: NEGATIVE /HPF — SIGNIFICANT CHANGE UP (ref 0–4)
S AUREUS DNA NOSE QL NAA+PROBE: SIGNIFICANT CHANGE UP
SODIUM SERPL-SCNC: 141 MMOL/L — SIGNIFICANT CHANGE UP (ref 135–145)
SP GR SPEC: 1.02 — SIGNIFICANT CHANGE UP (ref 1.01–1.02)
UROBILINOGEN FLD QL: 1 MG/DL
WBC # BLD: 6.1 K/UL — SIGNIFICANT CHANGE UP (ref 3.8–10.5)
WBC # FLD AUTO: 6.1 K/UL — SIGNIFICANT CHANGE UP (ref 3.8–10.5)
WBC UR QL: SIGNIFICANT CHANGE UP

## 2017-09-15 RX ORDER — PREGABALIN 225 MG/1
1 CAPSULE ORAL
Qty: 0 | Refills: 0 | COMMUNITY

## 2017-09-15 NOTE — ASU PATIENT PROFILE, ADULT - PMH
HTN (hypertension)    Hypothyroidism, unspecified type    Intervertebral disc disorder  S/P cervical fusion  Lung cancer    Macular degeneration  left eye  Osteoporosis    Vitamin B12 deficiency

## 2017-09-15 NOTE — CHART NOTE - NSCHARTNOTEFT_GEN_A_CORE
Vital Signs:  Height 5' 3", weight 123 pounds (55.9 Kg), B/P 138/74, HR 72, Resp 16, TEmp 97.9F, O2 Sat 98% room air    Patient instructed on     1. NPO post midnight of surgery  2. On the use of EZ sponges  3. Mupirocin use  4. May take Synthroid with a sip of water on morning of procedure Vital Signs:  Height 5' 3", weight 123 pounds (55.9 Kg), B/P 138/74, HR 72, Resp 16, Temp 97.9F, O2 Sat 98% room air    Patient instructed on     1. NPO post midnight of surgery  2. On the use of EZ sponges  3. Mupirocin use  4. May take Synthroid with a sip of water on morning of procedure

## 2017-09-15 NOTE — ASU PATIENT PROFILE, ADULT - VISION (WITH CORRECTIVE LENSES IF THE PATIENT USUALLY WEARS THEM):
does wear glasses/Normal vision: sees adequately in most situations; can see medication labels, newsprint

## 2017-09-15 NOTE — ASU PATIENT PROFILE, ADULT - PSH
H/O oophorectomy  Right side  H/O spinal fusion  anterior cervical fusion 2x   15 yrs ago, 2014  H/O umbilical hernia repair    History of facial surgery  right cheekbone surgery - 06/2017  History of lung surgery  right lung nodules removed 2015  S/P breast lumpectomy

## 2017-09-20 RX ORDER — SODIUM CHLORIDE 9 MG/ML
1000 INJECTION, SOLUTION INTRAVENOUS
Qty: 0 | Refills: 0 | Status: DISCONTINUED | OUTPATIENT
Start: 2017-09-21 | End: 2017-09-21

## 2017-09-20 RX ORDER — OXYCODONE HYDROCHLORIDE 5 MG/1
5 TABLET ORAL EVERY 4 HOURS
Qty: 0 | Refills: 0 | Status: DISCONTINUED | OUTPATIENT
Start: 2017-09-21 | End: 2017-09-21

## 2017-09-21 ENCOUNTER — OUTPATIENT (OUTPATIENT)
Dept: OUTPATIENT SERVICES | Facility: HOSPITAL | Age: 77
LOS: 1 days | Discharge: HOME | End: 2017-09-21
Payer: MEDICARE

## 2017-09-21 VITALS
DIASTOLIC BLOOD PRESSURE: 68 MMHG | SYSTOLIC BLOOD PRESSURE: 150 MMHG | OXYGEN SATURATION: 98 % | HEART RATE: 66 BPM | RESPIRATION RATE: 18 BRPM | TEMPERATURE: 98 F

## 2017-09-21 VITALS
SYSTOLIC BLOOD PRESSURE: 127 MMHG | HEART RATE: 82 BPM | HEIGHT: 63 IN | RESPIRATION RATE: 16 BRPM | TEMPERATURE: 98 F | OXYGEN SATURATION: 95 % | WEIGHT: 121.92 LBS | DIASTOLIC BLOOD PRESSURE: 69 MMHG

## 2017-09-21 DIAGNOSIS — E03.9 HYPOTHYROIDISM, UNSPECIFIED: ICD-10-CM

## 2017-09-21 DIAGNOSIS — Z98.890 OTHER SPECIFIED POSTPROCEDURAL STATES: Chronic | ICD-10-CM

## 2017-09-21 DIAGNOSIS — Z79.82 LONG TERM (CURRENT) USE OF ASPIRIN: ICD-10-CM

## 2017-09-21 DIAGNOSIS — E83.110 HEREDITARY HEMOCHROMATOSIS: ICD-10-CM

## 2017-09-21 DIAGNOSIS — Z98.1 ARTHRODESIS STATUS: Chronic | ICD-10-CM

## 2017-09-21 DIAGNOSIS — C34.00 MALIGNANT NEOPLASM OF UNSPECIFIED MAIN BRONCHUS: ICD-10-CM

## 2017-09-21 DIAGNOSIS — Z87.891 PERSONAL HISTORY OF NICOTINE DEPENDENCE: ICD-10-CM

## 2017-09-21 DIAGNOSIS — Z90.721 ACQUIRED ABSENCE OF OVARIES, UNILATERAL: Chronic | ICD-10-CM

## 2017-09-21 DIAGNOSIS — C34.02 MALIGNANT NEOPLASM OF LEFT MAIN BRONCHUS: ICD-10-CM

## 2017-09-21 DIAGNOSIS — C79.51 SECONDARY MALIGNANT NEOPLASM OF BONE: ICD-10-CM

## 2017-09-21 DIAGNOSIS — I10 ESSENTIAL (PRIMARY) HYPERTENSION: ICD-10-CM

## 2017-09-21 PROCEDURE — 71010: CPT | Mod: 26

## 2017-09-21 RX ORDER — ONDANSETRON 8 MG/1
4 TABLET, FILM COATED ORAL ONCE
Qty: 0 | Refills: 0 | Status: DISCONTINUED | OUTPATIENT
Start: 2017-09-21 | End: 2017-09-21

## 2017-09-21 RX ORDER — OXYCODONE HYDROCHLORIDE 5 MG/1
5 TABLET ORAL EVERY 4 HOURS
Qty: 0 | Refills: 0 | Status: DISCONTINUED | OUTPATIENT
Start: 2017-09-21 | End: 2017-09-21

## 2017-09-21 RX ORDER — PREGABALIN 225 MG/1
0 CAPSULE ORAL
Qty: 0 | Refills: 0 | COMMUNITY

## 2017-09-21 RX ORDER — METOPROLOL TARTRATE 50 MG
1 TABLET ORAL
Qty: 0 | Refills: 0 | COMMUNITY

## 2017-09-21 RX ORDER — SERTRALINE 25 MG/1
1 TABLET, FILM COATED ORAL
Qty: 0 | Refills: 0 | COMMUNITY

## 2017-09-21 RX ORDER — SODIUM CHLORIDE 9 MG/ML
1000 INJECTION, SOLUTION INTRAVENOUS
Qty: 0 | Refills: 0 | Status: DISCONTINUED | OUTPATIENT
Start: 2017-09-21 | End: 2017-10-06

## 2017-09-21 RX ORDER — LEVOTHYROXINE SODIUM 125 MCG
1 TABLET ORAL
Qty: 0 | Refills: 0 | COMMUNITY

## 2017-09-21 RX ORDER — ACETAMINOPHEN 500 MG
650 TABLET ORAL EVERY 6 HOURS
Qty: 0 | Refills: 0 | Status: DISCONTINUED | OUTPATIENT
Start: 2017-09-21 | End: 2017-10-06

## 2017-09-21 RX ORDER — FENTANYL CITRATE 50 UG/ML
25 INJECTION INTRAVENOUS
Qty: 0 | Refills: 0 | Status: DISCONTINUED | OUTPATIENT
Start: 2017-09-21 | End: 2017-09-21

## 2017-09-21 RX ADMIN — SODIUM CHLORIDE 125 MILLILITER(S): 9 INJECTION, SOLUTION INTRAVENOUS at 08:09

## 2017-09-21 RX ADMIN — OXYCODONE HYDROCHLORIDE 5 MILLIGRAM(S): 5 TABLET ORAL at 08:36

## 2017-09-21 NOTE — ASU DISCHARGE PLAN (ADULT/PEDIATRIC). - NURSING INSTRUCTIONS
For any problems or concerns,contact your doctor. [facility] patients should call the [facility]. If you cannot reach the doctor or clinic, call [facility] at [phone] or go to your local Emergency Department.  A responsible adult should be with you for the rest of the day and night for your safety and to help you if you needed. Resume your medications as listed on the attached Medication Record. Begin with liquids and light food ( tea, toast, Jello, soups). Advance to what you normally eat. Liquids should taken in adequate amounts today.     CALL the DOCTOR:    -Fever greater than  101F  - Signs  of infection such as : increase pain,swelling,redness,or a bad  smell coming from the wound.  -Excessive amount of bleeding.  - Any pain that appears to be getting worse.  - Vomiting  -  If you have  not urinated 8 hours after surgery or have any difficulty urinating.     A responsible adult should be with you for the rest of the day and night for your safety and to help you if you needed.    Review attached FACT SHEET if applicable.

## 2017-09-21 NOTE — BRIEF OPERATIVE NOTE - PROCEDURE
<<-----Click on this checkbox to enter Procedure Central line placement  09/21/2017    Active  [email or web address]

## 2017-09-21 NOTE — ASU DISCHARGE PLAN (ADULT/PEDIATRIC). - MEDICATION SUMMARY - MEDICATIONS TO TAKE
I will START or STAY ON the medications listed below when I get home from the hospital:    sertraline 50 mg oral tablet  -- 1 tab(s) by mouth once a day  -- Indication: For LUNG CANCER    losartan-hydrochlorothiazide 50mg-12.5mg oral tablet  -- 1 tab(s) by mouth once a day in the afternoon  -- Indication: For LUNG CANCER    Metoprolol Succinate ER 50 mg oral tablet, extended release  -- 1 tab(s) by mouth once a day (at bedtime)  -- Indication: For LUNG CANCER    Synthroid 112 mcg (0.112 mg) oral tablet  -- 1 tab(s) by mouth once a day  -- Indication: For LUNG CANCER    Vitamin B-12  --  injectable once a month  -- Indication: For LUNG CANCER

## 2017-10-09 ENCOUNTER — OUTPATIENT (OUTPATIENT)
Dept: OUTPATIENT SERVICES | Facility: HOSPITAL | Age: 77
LOS: 1 days | Discharge: HOME | End: 2017-10-09

## 2017-10-09 DIAGNOSIS — Z90.721 ACQUIRED ABSENCE OF OVARIES, UNILATERAL: Chronic | ICD-10-CM

## 2017-10-09 DIAGNOSIS — Z98.890 OTHER SPECIFIED POSTPROCEDURAL STATES: Chronic | ICD-10-CM

## 2017-10-09 DIAGNOSIS — Z98.1 ARTHRODESIS STATUS: Chronic | ICD-10-CM

## 2017-10-09 DIAGNOSIS — E03.8 OTHER SPECIFIED HYPOTHYROIDISM: ICD-10-CM

## 2017-10-09 LAB — TSH SERPL-MCNC: 8.56 UU/ML — HIGH (ref 0.36–3.74)

## 2017-10-24 ENCOUNTER — OUTPATIENT (OUTPATIENT)
Dept: OUTPATIENT SERVICES | Facility: HOSPITAL | Age: 77
LOS: 1 days | End: 2017-10-24
Payer: MEDICARE

## 2017-10-24 ENCOUNTER — APPOINTMENT (OUTPATIENT)
Dept: MRI IMAGING | Facility: CLINIC | Age: 77
End: 2017-10-24
Payer: MEDICARE

## 2017-10-24 DIAGNOSIS — Z98.890 OTHER SPECIFIED POSTPROCEDURAL STATES: Chronic | ICD-10-CM

## 2017-10-24 DIAGNOSIS — Z98.1 ARTHRODESIS STATUS: Chronic | ICD-10-CM

## 2017-10-24 DIAGNOSIS — Z90.721 ACQUIRED ABSENCE OF OVARIES, UNILATERAL: Chronic | ICD-10-CM

## 2017-10-24 DIAGNOSIS — Z00.8 ENCOUNTER FOR OTHER GENERAL EXAMINATION: ICD-10-CM

## 2017-10-24 PROCEDURE — 70543 MRI ORBT/FAC/NCK W/O &W/DYE: CPT | Mod: 26

## 2017-10-24 PROCEDURE — 82565 ASSAY OF CREATININE: CPT

## 2017-10-24 PROCEDURE — A9585: CPT

## 2017-10-24 PROCEDURE — 70553 MRI BRAIN STEM W/O & W/DYE: CPT | Mod: 26

## 2017-10-24 PROCEDURE — 70553 MRI BRAIN STEM W/O & W/DYE: CPT

## 2017-10-24 PROCEDURE — 70543 MRI ORBT/FAC/NCK W/O &W/DYE: CPT

## 2017-11-09 ENCOUNTER — EMERGENCY (EMERGENCY)
Facility: HOSPITAL | Age: 77
LOS: 0 days | Discharge: HOME | End: 2017-11-09
Attending: FAMILY MEDICINE | Admitting: FAMILY MEDICINE
Payer: MEDICARE

## 2017-11-09 VITALS — WEIGHT: 121.03 LBS

## 2017-11-09 VITALS
TEMPERATURE: 99 F | HEART RATE: 81 BPM | SYSTOLIC BLOOD PRESSURE: 118 MMHG | OXYGEN SATURATION: 100 % | RESPIRATION RATE: 18 BRPM | DIASTOLIC BLOOD PRESSURE: 64 MMHG

## 2017-11-09 DIAGNOSIS — Z98.890 OTHER SPECIFIED POSTPROCEDURAL STATES: Chronic | ICD-10-CM

## 2017-11-09 DIAGNOSIS — Z90.721 ACQUIRED ABSENCE OF OVARIES, UNILATERAL: Chronic | ICD-10-CM

## 2017-11-09 DIAGNOSIS — Z98.1 ARTHRODESIS STATUS: Chronic | ICD-10-CM

## 2017-11-09 LAB
ALBUMIN SERPL ELPH-MCNC: 3.4 G/DL — SIGNIFICANT CHANGE UP (ref 3.3–5)
ALP SERPL-CCNC: 113 U/L — SIGNIFICANT CHANGE UP (ref 40–120)
ALT FLD-CCNC: 27 U/L — SIGNIFICANT CHANGE UP (ref 12–78)
ANION GAP SERPL CALC-SCNC: 8 MMOL/L — SIGNIFICANT CHANGE UP (ref 5–17)
APTT BLD: 25 SEC — LOW (ref 27.5–37.4)
AST SERPL-CCNC: 20 U/L — SIGNIFICANT CHANGE UP (ref 15–37)
BASOPHILS # BLD AUTO: 0 K/UL — SIGNIFICANT CHANGE UP (ref 0–0.2)
BILIRUB SERPL-MCNC: 0.6 MG/DL — SIGNIFICANT CHANGE UP (ref 0.2–1.2)
BUN SERPL-MCNC: 21 MG/DL — SIGNIFICANT CHANGE UP (ref 7–23)
CALCIUM SERPL-MCNC: 9.3 MG/DL — SIGNIFICANT CHANGE UP (ref 8.5–10.1)
CHLORIDE SERPL-SCNC: 96 MMOL/L — SIGNIFICANT CHANGE UP (ref 96–108)
CK SERPL-CCNC: 30 U/L — SIGNIFICANT CHANGE UP (ref 26–192)
CO2 SERPL-SCNC: 29 MMOL/L — SIGNIFICANT CHANGE UP (ref 22–31)
CREAT SERPL-MCNC: 0.99 MG/DL — SIGNIFICANT CHANGE UP (ref 0.5–1.3)
EOSINOPHIL # BLD AUTO: 0 K/UL — SIGNIFICANT CHANGE UP (ref 0–0.5)
GLUCOSE SERPL-MCNC: 92 MG/DL — SIGNIFICANT CHANGE UP (ref 70–99)
HCT VFR BLD CALC: 30.8 % — LOW (ref 34.5–45)
HGB BLD-MCNC: 10.6 G/DL — LOW (ref 11.5–15.5)
HYPOCHROMIA BLD QL: SLIGHT — SIGNIFICANT CHANGE UP
INR BLD: 1.03 RATIO — SIGNIFICANT CHANGE UP (ref 0.88–1.16)
LYMPHOCYTES # BLD AUTO: 0.4 K/UL — LOW (ref 1–3.3)
LYMPHOCYTES # BLD AUTO: 11 % — LOW (ref 13–44)
MACROCYTES BLD QL: SLIGHT — SIGNIFICANT CHANGE UP
MANUAL DIF COMMENT BLD-IMP: SIGNIFICANT CHANGE UP
MCHC RBC-ENTMCNC: 32.4 PG — SIGNIFICANT CHANGE UP (ref 27–34)
MCHC RBC-ENTMCNC: 34.4 GM/DL — SIGNIFICANT CHANGE UP (ref 32–36)
MCV RBC AUTO: 94.3 FL — SIGNIFICANT CHANGE UP (ref 80–100)
MONOCYTES # BLD AUTO: 0.2 K/UL — SIGNIFICANT CHANGE UP (ref 0–0.9)
MONOCYTES NFR BLD AUTO: 6 % — SIGNIFICANT CHANGE UP (ref 2–14)
NEUTROPHILS # BLD AUTO: 1.9 K/UL — SIGNIFICANT CHANGE UP (ref 1.8–7.4)
NEUTROPHILS NFR BLD AUTO: 83 % — HIGH (ref 43–77)
NEUTS HYPERSEG # BLD: PRESENT — SIGNIFICANT CHANGE UP
PLAT MORPH BLD: NORMAL — SIGNIFICANT CHANGE UP
PLATELET # BLD AUTO: 184 K/UL — SIGNIFICANT CHANGE UP (ref 150–400)
POTASSIUM SERPL-MCNC: 3.8 MMOL/L — SIGNIFICANT CHANGE UP (ref 3.5–5.3)
POTASSIUM SERPL-SCNC: 3.8 MMOL/L — SIGNIFICANT CHANGE UP (ref 3.5–5.3)
PROT SERPL-MCNC: 7.9 GM/DL — SIGNIFICANT CHANGE UP (ref 6–8.3)
PROTHROM AB SERPL-ACNC: 11.1 SEC — SIGNIFICANT CHANGE UP (ref 9.8–12.7)
RBC # BLD: 3.27 M/UL — LOW (ref 3.8–5.2)
RBC # FLD: 12.2 % — SIGNIFICANT CHANGE UP (ref 10.3–14.5)
RBC BLD AUTO: SIGNIFICANT CHANGE UP
SODIUM SERPL-SCNC: 133 MMOL/L — LOW (ref 135–145)
TOXIC GRANULES BLD QL SMEAR: PRESENT — SIGNIFICANT CHANGE UP
TROPONIN I SERPL-MCNC: <0.015 NG/ML — SIGNIFICANT CHANGE UP (ref 0.01–0.04)
TROPONIN I SERPL-MCNC: <0.015 NG/ML — SIGNIFICANT CHANGE UP (ref 0.01–0.04)
WBC # BLD: 2.5 K/UL — LOW (ref 3.8–10.5)
WBC # FLD AUTO: 2.5 K/UL — LOW (ref 3.8–10.5)

## 2017-11-09 PROCEDURE — 71275 CT ANGIOGRAPHY CHEST: CPT | Mod: 26

## 2017-11-09 PROCEDURE — 99285 EMERGENCY DEPT VISIT HI MDM: CPT

## 2017-11-09 PROCEDURE — 93010 ELECTROCARDIOGRAM REPORT: CPT

## 2017-11-09 RX ORDER — MORPHINE SULFATE 50 MG/1
4 CAPSULE, EXTENDED RELEASE ORAL ONCE
Qty: 0 | Refills: 0 | Status: DISCONTINUED | OUTPATIENT
Start: 2017-11-09 | End: 2017-11-09

## 2017-11-09 RX ORDER — SODIUM CHLORIDE 9 MG/ML
1000 INJECTION INTRAMUSCULAR; INTRAVENOUS; SUBCUTANEOUS ONCE
Qty: 0 | Refills: 0 | Status: COMPLETED | OUTPATIENT
Start: 2017-11-09 | End: 2017-11-09

## 2017-11-09 RX ORDER — SODIUM CHLORIDE 9 MG/ML
3 INJECTION INTRAMUSCULAR; INTRAVENOUS; SUBCUTANEOUS ONCE
Qty: 0 | Refills: 0 | Status: COMPLETED | OUTPATIENT
Start: 2017-11-09 | End: 2017-11-09

## 2017-11-09 RX ORDER — MORPHINE SULFATE 50 MG/1
2 CAPSULE, EXTENDED RELEASE ORAL ONCE
Qty: 0 | Refills: 0 | Status: DISCONTINUED | OUTPATIENT
Start: 2017-11-09 | End: 2017-11-09

## 2017-11-09 RX ORDER — ONDANSETRON 8 MG/1
8 TABLET, FILM COATED ORAL ONCE
Qty: 0 | Refills: 0 | Status: COMPLETED | OUTPATIENT
Start: 2017-11-09 | End: 2017-11-09

## 2017-11-09 RX ADMIN — MORPHINE SULFATE 2 MILLIGRAM(S): 50 CAPSULE, EXTENDED RELEASE ORAL at 18:41

## 2017-11-09 RX ADMIN — SODIUM CHLORIDE 1000 MILLILITER(S): 9 INJECTION INTRAMUSCULAR; INTRAVENOUS; SUBCUTANEOUS at 16:45

## 2017-11-09 RX ADMIN — MORPHINE SULFATE 4 MILLIGRAM(S): 50 CAPSULE, EXTENDED RELEASE ORAL at 18:31

## 2017-11-09 RX ADMIN — MORPHINE SULFATE 4 MILLIGRAM(S): 50 CAPSULE, EXTENDED RELEASE ORAL at 16:43

## 2017-11-09 RX ADMIN — SODIUM CHLORIDE 3 MILLILITER(S): 9 INJECTION INTRAMUSCULAR; INTRAVENOUS; SUBCUTANEOUS at 16:45

## 2017-11-09 RX ADMIN — ONDANSETRON 8 MILLIGRAM(S): 8 TABLET, FILM COATED ORAL at 16:45

## 2017-11-09 NOTE — ED PROVIDER NOTE - CARE PLAN
Principal Discharge DX:	Primary malignant neoplasm of lung metastatic to other site, unspecified laterality

## 2017-11-09 NOTE — ED ADULT TRIAGE NOTE - CHIEF COMPLAINT QUOTE
pt c/o dizziness, chest pain, right sided facial pain since yesteday. 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 pt also has vascular dementia. pt denies SOb

## 2017-11-09 NOTE — ED PROVIDER NOTE - OBJECTIVE STATEMENT
75 y/o F with a PMHx of HTN, metastatic lung CA to pelvis receiving radiation and chemo presents to the ED c/o chest pain, L facial pain worsening over the past few days. Pt  at bedside providing hx states that she is receiving tx from Radiation therapy Dr Calderón, decreased PO intake. Pt  states that her last round of chemo was 11/02/17, port R chest wall, and takes oxycodone qhs. Pt currently calm, quiet, difficulty speaking, feeling weak and denies fever, chills, NVD, abd pain, LOC, HA or any other acute c/o at this time. Pt  states that she is a former smoker and does consume EtOH daily. PMD [place]. Onco Paul. Pt takes Synthroid, HCTZ, HCL, Metoprolol, Dexamethasone, oxycodone.

## 2017-11-09 NOTE — ED PROVIDER NOTE - CONSTITUTIONAL, MLM
normal... Ill, tired appearing, elderly female, awake, alert, oriented to person, place, time/situation and in no apparent distress.

## 2017-11-09 NOTE — ED PROVIDER NOTE - PROGRESS NOTE DETAILS
YANIQUE Deleon have attested this document for and under the supervision of Dr. Coughlin Sierra SP: Dr. Coughlin spoke with Dr. Hogan who states if pain is controlled pt can follow up in his office tomorrow with his partner. Documentation shared with angelito Elliott. Agree with notes. Ced ZELAYA

## 2017-11-09 NOTE — ED PROVIDER NOTE - DIAGNOSTIC INTERPRETATION
CT chest- There are new metastases to the liver, peritoneum, subcutaneous abdominal and chest wall, and sternum.

## 2017-11-09 NOTE — ED ADULT NURSE NOTE - OBJECTIVE STATEMENT
Pt c/o chest apin x 2 days. Pain increases with touch, inspiration and movement. Pain is reproducable. No other complaints

## 2017-11-09 NOTE — ED PROVIDER NOTE - MEDICAL DECISION MAKING DETAILS
77 y/o F PMHx of metastatic lung CA to pelvis, receiving chemo, radiation, c/o chest discomfort, facial pain with plans to receive EKG, labs, CBC

## 2017-11-11 DIAGNOSIS — E03.9 HYPOTHYROIDISM, UNSPECIFIED: ICD-10-CM

## 2017-11-11 DIAGNOSIS — C79.89 SECONDARY MALIGNANT NEOPLASM OF OTHER SPECIFIED SITES: ICD-10-CM

## 2017-11-11 DIAGNOSIS — M81.0 AGE-RELATED OSTEOPOROSIS WITHOUT CURRENT PATHOLOGICAL FRACTURE: ICD-10-CM

## 2017-11-11 DIAGNOSIS — Z88.1 ALLERGY STATUS TO OTHER ANTIBIOTIC AGENTS STATUS: ICD-10-CM

## 2017-11-11 DIAGNOSIS — Z87.19 PERSONAL HISTORY OF OTHER DISEASES OF THE DIGESTIVE SYSTEM: ICD-10-CM

## 2017-11-11 DIAGNOSIS — R07.9 CHEST PAIN, UNSPECIFIED: ICD-10-CM

## 2017-11-11 DIAGNOSIS — H35.30 UNSPECIFIED MACULAR DEGENERATION: ICD-10-CM

## 2017-11-11 DIAGNOSIS — D50.9 IRON DEFICIENCY ANEMIA, UNSPECIFIED: ICD-10-CM

## 2017-11-11 DIAGNOSIS — C34.90 MALIGNANT NEOPLASM OF UNSPECIFIED PART OF UNSPECIFIED BRONCHUS OR LUNG: ICD-10-CM

## 2017-11-11 DIAGNOSIS — M50.90 CERVICAL DISC DISORDER, UNSPECIFIED, UNSPECIFIED CERVICAL REGION: ICD-10-CM

## 2017-11-11 DIAGNOSIS — Z98.890 OTHER SPECIFIED POSTPROCEDURAL STATES: ICD-10-CM

## 2017-11-11 DIAGNOSIS — Z87.891 PERSONAL HISTORY OF NICOTINE DEPENDENCE: ICD-10-CM

## 2017-11-11 DIAGNOSIS — I10 ESSENTIAL (PRIMARY) HYPERTENSION: ICD-10-CM

## 2017-11-11 DIAGNOSIS — Z98.1 ARTHRODESIS STATUS: ICD-10-CM

## 2017-11-11 DIAGNOSIS — Z90.721 ACQUIRED ABSENCE OF OVARIES, UNILATERAL: ICD-10-CM

## 2017-11-11 DIAGNOSIS — Z91.040 LATEX ALLERGY STATUS: ICD-10-CM

## 2017-11-28 ENCOUNTER — INPATIENT (INPATIENT)
Facility: HOSPITAL | Age: 77
LOS: 6 days | Discharge: HOSPICE | End: 2017-12-05
Attending: INTERNAL MEDICINE | Admitting: INTERNAL MEDICINE
Payer: MEDICARE

## 2017-11-28 VITALS
WEIGHT: 154.98 LBS | SYSTOLIC BLOOD PRESSURE: 114 MMHG | HEIGHT: 64 IN | HEART RATE: 68 BPM | DIASTOLIC BLOOD PRESSURE: 67 MMHG | RESPIRATION RATE: 16 BRPM | TEMPERATURE: 97 F | OXYGEN SATURATION: 96 %

## 2017-11-28 DIAGNOSIS — Z98.890 OTHER SPECIFIED POSTPROCEDURAL STATES: Chronic | ICD-10-CM

## 2017-11-28 DIAGNOSIS — Z98.1 ARTHRODESIS STATUS: Chronic | ICD-10-CM

## 2017-11-28 DIAGNOSIS — Z90.721 ACQUIRED ABSENCE OF OVARIES, UNILATERAL: Chronic | ICD-10-CM

## 2017-11-28 LAB
ABO RH CONFIRMATION: SIGNIFICANT CHANGE UP
ALBUMIN SERPL ELPH-MCNC: 2.7 G/DL — LOW (ref 3.3–5)
ALP SERPL-CCNC: 145 U/L — HIGH (ref 40–120)
ALT FLD-CCNC: 15 U/L — SIGNIFICANT CHANGE UP (ref 12–78)
ANION GAP SERPL CALC-SCNC: 9 MMOL/L — SIGNIFICANT CHANGE UP (ref 5–17)
ANISOCYTOSIS BLD QL: SLIGHT — SIGNIFICANT CHANGE UP
APTT BLD: 28.3 SEC — SIGNIFICANT CHANGE UP (ref 27.5–37.4)
AST SERPL-CCNC: 23 U/L — SIGNIFICANT CHANGE UP (ref 15–37)
BASO STIPL BLD QL SMEAR: SLIGHT — SIGNIFICANT CHANGE UP
BILIRUB SERPL-MCNC: 0.3 MG/DL — SIGNIFICANT CHANGE UP (ref 0.2–1.2)
BLD GP AB SCN SERPL QL: SIGNIFICANT CHANGE UP
BUN SERPL-MCNC: 11 MG/DL — SIGNIFICANT CHANGE UP (ref 7–23)
CALCIUM SERPL-MCNC: 9.3 MG/DL — SIGNIFICANT CHANGE UP (ref 8.5–10.1)
CHLORIDE SERPL-SCNC: 100 MMOL/L — SIGNIFICANT CHANGE UP (ref 96–108)
CO2 SERPL-SCNC: 27 MMOL/L — SIGNIFICANT CHANGE UP (ref 22–31)
CREAT SERPL-MCNC: 0.78 MG/DL — SIGNIFICANT CHANGE UP (ref 0.5–1.3)
ELLIPTOCYTES BLD QL SMEAR: SLIGHT — SIGNIFICANT CHANGE UP
EOSINOPHIL NFR BLD AUTO: 1 % — SIGNIFICANT CHANGE UP (ref 0–6)
GIANT PLATELETS BLD QL SMEAR: PRESENT — SIGNIFICANT CHANGE UP
GLUCOSE SERPL-MCNC: 106 MG/DL — HIGH (ref 70–99)
HCT VFR BLD CALC: 23.2 % — LOW (ref 34.5–45)
HGB BLD-MCNC: 7.9 G/DL — LOW (ref 11.5–15.5)
INR BLD: 1.18 RATIO — HIGH (ref 0.88–1.16)
LYMPHOCYTES # BLD AUTO: 7 % — LOW (ref 13–44)
MACROCYTES BLD QL: SLIGHT — SIGNIFICANT CHANGE UP
MANUAL DIF COMMENT BLD-IMP: SIGNIFICANT CHANGE UP
MCHC RBC-ENTMCNC: 32.1 PG — SIGNIFICANT CHANGE UP (ref 27–34)
MCHC RBC-ENTMCNC: 34 GM/DL — SIGNIFICANT CHANGE UP (ref 32–36)
MCV RBC AUTO: 94.5 FL — SIGNIFICANT CHANGE UP (ref 80–100)
MONOCYTES NFR BLD AUTO: 13 % — SIGNIFICANT CHANGE UP (ref 2–14)
NEUTROPHILS NFR BLD AUTO: 79 % — HIGH (ref 43–77)
PLAT MORPH BLD: NORMAL — SIGNIFICANT CHANGE UP
PLATELET # BLD AUTO: 326 K/UL — SIGNIFICANT CHANGE UP (ref 150–400)
POIKILOCYTOSIS BLD QL AUTO: SLIGHT — SIGNIFICANT CHANGE UP
POLYCHROMASIA BLD QL SMEAR: SLIGHT — SIGNIFICANT CHANGE UP
POTASSIUM SERPL-MCNC: 4 MMOL/L — SIGNIFICANT CHANGE UP (ref 3.5–5.3)
POTASSIUM SERPL-SCNC: 4 MMOL/L — SIGNIFICANT CHANGE UP (ref 3.5–5.3)
PROT SERPL-MCNC: 7.1 GM/DL — SIGNIFICANT CHANGE UP (ref 6–8.3)
PROTHROM AB SERPL-ACNC: 12.8 SEC — HIGH (ref 9.8–12.7)
RBC # BLD: 2.45 M/UL — LOW (ref 3.8–5.2)
RBC # FLD: 14.4 % — SIGNIFICANT CHANGE UP (ref 10.3–14.5)
RBC BLD AUTO: (no result)
SODIUM SERPL-SCNC: 136 MMOL/L — SIGNIFICANT CHANGE UP (ref 135–145)
TYPE + AB SCN PNL BLD: SIGNIFICANT CHANGE UP
WBC # BLD: 6.3 K/UL — SIGNIFICANT CHANGE UP (ref 3.8–10.5)
WBC # FLD AUTO: 6.3 K/UL — SIGNIFICANT CHANGE UP (ref 3.8–10.5)

## 2017-11-28 PROCEDURE — 99285 EMERGENCY DEPT VISIT HI MDM: CPT

## 2017-11-28 PROCEDURE — 70450 CT HEAD/BRAIN W/O DYE: CPT | Mod: 26

## 2017-11-28 NOTE — ED PROVIDER NOTE - MEDICAL DECISION MAKING DETAILS
Pt with metastatic cancer now with left leg involuntary movements and weakness. R/o brain vs. spinal cord lesion.

## 2017-11-28 NOTE — ED PROVIDER NOTE - PROGRESS NOTE DETAILS
Spoke to Dr. Villarreal neurosurgeon who reviewed mris of thoracic and lumbar spine. State there is no lesion that could be causing pt's weakness of left leg. Advised admission to medicine and further workup. Ced ZELAYA

## 2017-11-28 NOTE — ED PROVIDER NOTE - OBJECTIVE STATEMENT
75 y/o female with PMHx of osteoporosis, intervertebral disc disorder s/p cervical fusion, Vitamin B12 deficiency, macular degeneration of the left eye, HTN, lung CA (mets to pelvis and breast bone) s/p chemo, radiation, and Optivo treatment and PSHx of right oophorectomy, right lung nodule removal (2015), breast lumpectomy presents to the ED s/p fall. Pt was in the bathroom this morning when she felt her left leg "give out." Pt fell to the floor but did not injure herself. Pt has been ambulatory with a limp. Feels no pain, but continues to feel as if her left leg is giving out. Pt called her oncologist and radiation doctors who advised her to visit the ED. No hx of injury otherwise. Non-smoker, does not drink, no drug use. Oncologist Dr. Kramer. Radiation Dr. Jose. 77 y/o female with PMHx of osteoporosis, intervertebral disc disorder s/p cervical fusion, Vitamin B12 deficiency, macular degeneration of the left eye, HTN, lung CA (mets to pelvis and breast bone) s/p chemo, radiation, and Opdivo treatment and PSHx of right oophorectomy, right lung nodule removal (2015), breast lumpectomy presents to the ED s/p fall. Pt was in the bathroom this morning when she felt her left leg "give out." Pt fell to the floor but did not injure herself. Pt has been ambulatory with a limp. Feels no pain, but continues to feel as if her left leg is giving out. Pt called her oncologist and radiation doctors who advised her to visit the ED. No hx of injury otherwise. Non-smoker, does not drink, no drug use. Oncologist Dr. Warren. Radiation Dr. Jose.

## 2017-11-28 NOTE — ED ADULT NURSE NOTE - OBJECTIVE STATEMENT
Pt alert and oriented x3. Pt presents s/p fall, pt states her Left leg keeps giving out. Pt denies blood thinners or hitting her head. Pt being treated for metastatic lung ca to pelvis on chemo and radiation. Pt recently seen in ED and was neutropenic. Precautions in place.  at bedside.

## 2017-11-28 NOTE — ED ADULT TRIAGE NOTE - CHIEF COMPLAINT QUOTE
Patient presetns post fall. Reports she is being treated for metastatic lung CA with chemo and radiation, reports weakness in left leg and her leg just 'gave out' reports falling to the ground hitting her right elbow, denies head trauma, denies blood thinner

## 2017-11-28 NOTE — ED PROVIDER NOTE - MUSCULOSKELETAL, MLM
Spine appears normal, range of motion is not limited, no muscle or joint tenderness. +Involuntary movement of both legs. +Motor strength 5/5 RLE, 2/5 LLE; Sensations intact. +Hyperreflexive right patella +No reflexes left patella. No clonus. No tenderness of the hip or knee.

## 2017-11-28 NOTE — ED PROVIDER NOTE - CARE PLAN
Principal Discharge DX:	Leg weakness  Secondary Diagnosis:	Primary malignant neoplasm of lung metastatic to other site, unspecified laterality

## 2017-11-29 LAB
ALBUMIN SERPL ELPH-MCNC: 2.5 G/DL — LOW (ref 3.3–5)
ALP SERPL-CCNC: 142 U/L — HIGH (ref 40–120)
ALT FLD-CCNC: 13 U/L — SIGNIFICANT CHANGE UP (ref 12–78)
ANION GAP SERPL CALC-SCNC: 6 MMOL/L — SIGNIFICANT CHANGE UP (ref 5–17)
APPEARANCE UR: CLEAR — SIGNIFICANT CHANGE UP
AST SERPL-CCNC: 19 U/L — SIGNIFICANT CHANGE UP (ref 15–37)
BACTERIA # UR AUTO: (no result)
BASOPHILS # BLD AUTO: 0.1 K/UL — SIGNIFICANT CHANGE UP (ref 0–0.2)
BASOPHILS NFR BLD AUTO: 1.3 % — SIGNIFICANT CHANGE UP (ref 0–2)
BILIRUB SERPL-MCNC: 0.3 MG/DL — SIGNIFICANT CHANGE UP (ref 0.2–1.2)
BILIRUB UR-MCNC: NEGATIVE — SIGNIFICANT CHANGE UP
BUN SERPL-MCNC: 8 MG/DL — SIGNIFICANT CHANGE UP (ref 7–23)
CALCIUM SERPL-MCNC: 8.8 MG/DL — SIGNIFICANT CHANGE UP (ref 8.5–10.1)
CEA SERPL-MCNC: 663.1 NG/ML — HIGH (ref 0–3.8)
CHLORIDE SERPL-SCNC: 100 MMOL/L — SIGNIFICANT CHANGE UP (ref 96–108)
CO2 SERPL-SCNC: 29 MMOL/L — SIGNIFICANT CHANGE UP (ref 22–31)
COLOR SPEC: YELLOW — SIGNIFICANT CHANGE UP
CREAT SERPL-MCNC: 0.62 MG/DL — SIGNIFICANT CHANGE UP (ref 0.5–1.3)
DIFF PNL FLD: NEGATIVE — SIGNIFICANT CHANGE UP
EOSINOPHIL # BLD AUTO: 0 K/UL — SIGNIFICANT CHANGE UP (ref 0–0.5)
EOSINOPHIL NFR BLD AUTO: 0.9 % — SIGNIFICANT CHANGE UP (ref 0–6)
EPI CELLS # UR: SIGNIFICANT CHANGE UP
GLUCOSE SERPL-MCNC: 99 MG/DL — SIGNIFICANT CHANGE UP (ref 70–99)
GLUCOSE UR QL: NEGATIVE MG/DL — SIGNIFICANT CHANGE UP
HCT VFR BLD CALC: 20.8 % — CRITICAL LOW (ref 34.5–45)
HCT VFR BLD CALC: 22 % — LOW (ref 34.5–45)
HGB BLD-MCNC: 7 G/DL — CRITICAL LOW (ref 11.5–15.5)
HGB BLD-MCNC: 7.5 G/DL — LOW (ref 11.5–15.5)
HYALINE CASTS # UR AUTO: (no result) /LPF
KETONES UR-MCNC: NEGATIVE — SIGNIFICANT CHANGE UP
LEUKOCYTE ESTERASE UR-ACNC: (no result)
LYMPHOCYTES # BLD AUTO: 0.7 K/UL — LOW (ref 1–3.3)
LYMPHOCYTES # BLD AUTO: 14.1 % — SIGNIFICANT CHANGE UP (ref 13–44)
MAGNESIUM SERPL-MCNC: 1.5 MG/DL — LOW (ref 1.6–2.6)
MCHC RBC-ENTMCNC: 32.4 PG — SIGNIFICANT CHANGE UP (ref 27–34)
MCHC RBC-ENTMCNC: 32.5 PG — SIGNIFICANT CHANGE UP (ref 27–34)
MCHC RBC-ENTMCNC: 33.8 GM/DL — SIGNIFICANT CHANGE UP (ref 32–36)
MCHC RBC-ENTMCNC: 34.2 GM/DL — SIGNIFICANT CHANGE UP (ref 32–36)
MCV RBC AUTO: 95.2 FL — SIGNIFICANT CHANGE UP (ref 80–100)
MCV RBC AUTO: 95.8 FL — SIGNIFICANT CHANGE UP (ref 80–100)
MONOCYTES # BLD AUTO: 0.7 K/UL — SIGNIFICANT CHANGE UP (ref 0–0.9)
MONOCYTES NFR BLD AUTO: 15 % — HIGH (ref 2–14)
NEUTROPHILS # BLD AUTO: 3.4 K/UL — SIGNIFICANT CHANGE UP (ref 1.8–7.4)
NEUTROPHILS NFR BLD AUTO: 68.6 % — SIGNIFICANT CHANGE UP (ref 43–77)
NITRITE UR-MCNC: NEGATIVE — SIGNIFICANT CHANGE UP
PH UR: 5 — SIGNIFICANT CHANGE UP (ref 5–8)
PLATELET # BLD AUTO: 289 K/UL — SIGNIFICANT CHANGE UP (ref 150–400)
PLATELET # BLD AUTO: 308 K/UL — SIGNIFICANT CHANGE UP (ref 150–400)
POTASSIUM SERPL-MCNC: 4 MMOL/L — SIGNIFICANT CHANGE UP (ref 3.5–5.3)
POTASSIUM SERPL-SCNC: 4 MMOL/L — SIGNIFICANT CHANGE UP (ref 3.5–5.3)
PROT SERPL-MCNC: 6.7 GM/DL — SIGNIFICANT CHANGE UP (ref 6–8.3)
PROT UR-MCNC: 15 MG/DL
RBC # BLD: 2.18 M/UL — LOW (ref 3.8–5.2)
RBC # BLD: 2.31 M/UL — LOW (ref 3.8–5.2)
RBC # FLD: 14.6 % — HIGH (ref 10.3–14.5)
RBC # FLD: 14.7 % — HIGH (ref 10.3–14.5)
RBC CASTS # UR COMP ASSIST: SIGNIFICANT CHANGE UP /HPF (ref 0–4)
SODIUM SERPL-SCNC: 135 MMOL/L — SIGNIFICANT CHANGE UP (ref 135–145)
SP GR SPEC: 1.01 — SIGNIFICANT CHANGE UP (ref 1.01–1.02)
UROBILINOGEN FLD QL: NEGATIVE MG/DL — SIGNIFICANT CHANGE UP
WBC # BLD: 4.7 K/UL — SIGNIFICANT CHANGE UP (ref 3.8–10.5)
WBC # BLD: 4.9 K/UL — SIGNIFICANT CHANGE UP (ref 3.8–10.5)
WBC # FLD AUTO: 4.7 K/UL — SIGNIFICANT CHANGE UP (ref 3.8–10.5)
WBC # FLD AUTO: 4.9 K/UL — SIGNIFICANT CHANGE UP (ref 3.8–10.5)
WBC UR QL: (no result)

## 2017-11-29 PROCEDURE — 70551 MRI BRAIN STEM W/O DYE: CPT | Mod: 26

## 2017-11-29 PROCEDURE — 99223 1ST HOSP IP/OBS HIGH 75: CPT

## 2017-11-29 PROCEDURE — 72141 MRI NECK SPINE W/O DYE: CPT | Mod: 26

## 2017-11-29 PROCEDURE — 72148 MRI LUMBAR SPINE W/O DYE: CPT | Mod: 26

## 2017-11-29 PROCEDURE — 72146 MRI CHEST SPINE W/O DYE: CPT | Mod: 26

## 2017-11-29 RX ORDER — SERTRALINE 25 MG/1
50 TABLET, FILM COATED ORAL DAILY
Qty: 0 | Refills: 0 | Status: DISCONTINUED | OUTPATIENT
Start: 2017-11-29 | End: 2017-12-05

## 2017-11-29 RX ORDER — SODIUM CHLORIDE 9 MG/ML
1000 INJECTION INTRAMUSCULAR; INTRAVENOUS; SUBCUTANEOUS
Qty: 0 | Refills: 0 | Status: DISCONTINUED | OUTPATIENT
Start: 2017-11-29 | End: 2017-12-04

## 2017-11-29 RX ORDER — DEXAMETHASONE 0.5 MG/5ML
10 ELIXIR ORAL ONCE
Qty: 0 | Refills: 0 | Status: COMPLETED | OUTPATIENT
Start: 2017-11-29 | End: 2017-11-29

## 2017-11-29 RX ORDER — METOPROLOL TARTRATE 50 MG
50 TABLET ORAL DAILY
Qty: 0 | Refills: 0 | Status: DISCONTINUED | OUTPATIENT
Start: 2017-11-29 | End: 2017-12-05

## 2017-11-29 RX ORDER — MORPHINE SULFATE 50 MG/1
2 CAPSULE, EXTENDED RELEASE ORAL ONCE
Qty: 0 | Refills: 0 | Status: DISCONTINUED | OUTPATIENT
Start: 2017-11-29 | End: 2017-11-29

## 2017-11-29 RX ORDER — LEVOTHYROXINE SODIUM 125 MCG
112 TABLET ORAL DAILY
Qty: 0 | Refills: 0 | Status: DISCONTINUED | OUTPATIENT
Start: 2017-11-29 | End: 2017-12-05

## 2017-11-29 RX ORDER — ENOXAPARIN SODIUM 100 MG/ML
40 INJECTION SUBCUTANEOUS EVERY 24 HOURS
Qty: 0 | Refills: 0 | Status: DISCONTINUED | OUTPATIENT
Start: 2017-11-29 | End: 2017-12-05

## 2017-11-29 RX ORDER — INFLUENZA VIRUS VACCINE 15; 15; 15; 15 UG/.5ML; UG/.5ML; UG/.5ML; UG/.5ML
0.5 SUSPENSION INTRAMUSCULAR ONCE
Qty: 0 | Refills: 0 | Status: COMPLETED | OUTPATIENT
Start: 2017-11-29 | End: 2017-12-05

## 2017-11-29 RX ORDER — OXYCODONE HYDROCHLORIDE 5 MG/1
5 TABLET ORAL EVERY 6 HOURS
Qty: 0 | Refills: 0 | Status: DISCONTINUED | OUTPATIENT
Start: 2017-11-29 | End: 2017-12-01

## 2017-11-29 RX ORDER — DEXAMETHASONE 0.5 MG/5ML
4 ELIXIR ORAL EVERY 6 HOURS
Qty: 0 | Refills: 0 | Status: DISCONTINUED | OUTPATIENT
Start: 2017-11-29 | End: 2017-12-02

## 2017-11-29 RX ORDER — HYDROMORPHONE HYDROCHLORIDE 2 MG/ML
0.5 INJECTION INTRAMUSCULAR; INTRAVENOUS; SUBCUTANEOUS ONCE
Qty: 0 | Refills: 0 | Status: DISCONTINUED | OUTPATIENT
Start: 2017-11-29 | End: 2017-12-05

## 2017-11-29 RX ORDER — PANTOPRAZOLE SODIUM 20 MG/1
40 TABLET, DELAYED RELEASE ORAL
Qty: 0 | Refills: 0 | Status: DISCONTINUED | OUTPATIENT
Start: 2017-11-29 | End: 2017-12-05

## 2017-11-29 RX ADMIN — SERTRALINE 50 MILLIGRAM(S): 25 TABLET, FILM COATED ORAL at 12:31

## 2017-11-29 RX ADMIN — Medication 4 MILLIGRAM(S): at 10:41

## 2017-11-29 RX ADMIN — PANTOPRAZOLE SODIUM 40 MILLIGRAM(S): 20 TABLET, DELAYED RELEASE ORAL at 18:12

## 2017-11-29 RX ADMIN — Medication 112 MICROGRAM(S): at 04:57

## 2017-11-29 RX ADMIN — Medication 50 MILLIGRAM(S): at 04:57

## 2017-11-29 RX ADMIN — ENOXAPARIN SODIUM 40 MILLIGRAM(S): 100 INJECTION SUBCUTANEOUS at 12:30

## 2017-11-29 RX ADMIN — OXYCODONE HYDROCHLORIDE 5 MILLIGRAM(S): 5 TABLET ORAL at 04:57

## 2017-11-29 RX ADMIN — MORPHINE SULFATE 2 MILLIGRAM(S): 50 CAPSULE, EXTENDED RELEASE ORAL at 01:55

## 2017-11-29 RX ADMIN — Medication 102 MILLIGRAM(S): at 04:57

## 2017-11-29 RX ADMIN — Medication 4 MILLIGRAM(S): at 19:34

## 2017-11-29 RX ADMIN — Medication 4 MILLIGRAM(S): at 15:39

## 2017-11-29 RX ADMIN — Medication 0.5 MILLIGRAM(S): at 19:34

## 2017-11-29 NOTE — H&P ADULT - NSHPLABSRESULTS_GEN_ALL_CORE
.  LABS:                         7.9    6.3   )-----------( 326      ( 2017 16:44 )             23.2         136  |  100  |  11  ----------------------------<  106<H>  4.0   |  27  |  0.78    Ca    9.3      2017 16:44    TPro  7.1  /  Alb  2.7<L>  /  TBili  0.3  /  DBili  x   /  AST  23  /  ALT  15  /  AlkPhos  145<H>      PT/INR - ( 2017 16:44 )   PT: 12.8 sec;   INR: 1.18 ratio         PTT - ( 2017 16:44 )  PTT:28.3 sec  Urinalysis Basic - ( 2017 00:01 )    Color: Yellow / Appearance: Clear / S.015 / pH: x  Gluc: x / Ketone: Negative  / Bili: Negative / Urobili: Negative mg/dL   Blood: x / Protein: 15 mg/dL / Nitrite: Negative   Leuk Esterase: Small / RBC: 0-2 /HPF / WBC 6-10   Sq Epi: x / Non Sq Epi: Few / Bacteria: Few            RADIOLOGY, EKG & ADDITIONAL TESTS: Reviewed.

## 2017-11-29 NOTE — H&P ADULT - NSHPPHYSICALEXAM_GEN_ALL_CORE
GENERAL APPEARANCE: in no acute distress emotional  HEENT: The oral mucosa, hard and soft palate, tongue and posterior pharynx were normal.  SKIN: Scalp lesions 1 cm   NECK: Supple and symmetric.   CHEST: Normal AP diameter and normal contour without any kyphoscoliosis.  LUNGS: Auscultation of the lungs revealed normal breath sounds without any other adventitious sounds or rubs.  CARDIOVASCULAR: There was a regular rate and rhythm without any murmurs, gallops, rubs.   ABDOMEN: Soft and nontender with normal bowel sounds.  EXTREMITIES: No cyanosis, clubbing or edema.  NEUROLOGIC: Alert and oriented x 2, Emotional. Clonic like movement of Left lower extremity at the proximal LE, No ankle clonus, LLE patellar + 1 reflex RIGHT lower extremity Patellar +2 sensation grossly intact, Achilles reflex bilateral +1 bilaterally, CN II - XII grossly intact, no spinal tenderness GENERAL APPEARANCE: in no acute distress emotional  HEENT: The oral mucosa, hard and soft palate, tongue and posterior pharynx were normal.  SKIN: Scalp lesions 1 cm frontal bone ( possible scalp met)   NECK: Supple and symmetric.   CHEST: Normal AP diameter and normal contour without any kyphoscoliosis.  LUNGS: Auscultation of the lungs revealed normal breath sounds without any other adventitious sounds or rubs.  CARDIOVASCULAR: There was a regular rate and rhythm without any murmurs, gallops, rubs.   ABDOMEN: Soft and nontender with normal bowel sounds.  EXTREMITIES: No cyanosis, clubbing or edema.  NEUROLOGIC: Alert and oriented x 2, Emotional. Clonic like movement of Left lower extremity at the proximal LE, No ankle clonus, LLE patellar + 1 reflex RIGHT lower extremity Patellar +2 sensation grossly intact, Achilles reflex bilateral +1 bilaterally, CN II - XII grossly intact, no spinal tenderness, upper extremity Left UE 4-/5 Right 5/5

## 2017-11-29 NOTE — ED ADULT NURSE REASSESSMENT NOTE - NS ED NURSE REASSESS COMMENT FT1
Care of patient transferred to [facility] at this time
Dr. Coughlin made aware only thoracic and lumbar MRI was completed.
Pt at MRI
Pt c/o increased back pain, Dr Coughlin made aware. Medicated per orders with partial relief noted. Sleeping comfortably but easily arousable at this time. Will continue to monitor.
Pt given sandwich, awaiting dispo.  at bedside
Pt resting in [place] [facility] +  report called to Mike on [facility] , Pt c/o pain   medicated for pain  4/10.  Iv site is clear . NS @ 50  at this time. Iv site is clear. Safety  is being maintained. Will monitor till transport . No changes noted .

## 2017-11-29 NOTE — H&P ADULT - HISTORY OF PRESENT ILLNESS
This is a 76-year-old female with vascular dementia, poor historian whereby patient's history was supposed to be a history of lung cancer diagnosed approximately 2 years ago treated with chemoradiation with subsequent progression of disease requiring palliative RT to various areas including left arm due to extensive bony metastasis as well as to lumbar spine most recently approximately 1-2 weeks ago by Dr. Calderón.  The patient also received an initial infusion of Opdivo presents today after 1 week history of recurrent falls.  Earlier today the patient noted that she had fallen which her left lower extremity gave out underneath her.  Patient denied any back pain patient denies any urinary incontinence and stool incontinence and has been able to intermittently walk and bear weight on the left lower extremity.  Emergency department patient underwent MRI of the thoracic spine in which significant for numerous osseous metastasis involving the thoracic T4-T5 with no pathological vertebral body compression or neoplastic encroachment into the spinal the 2.8 mm posterior protrusion contributing to slight central canal stenosis at T7-T8 with no word compression yet the report suggested cord impression. The patient was unable to tolerate The MRI C spine and MRI due to pain and the study was interupted.   The patient  had previously cervical spine fusion C5 several years ago   ONC: Dr. Warren   Radiation: Dr. Calderón   diagnosed: 2 years ago NSCLC   Treatment: Chemo+ Radiation, left side of face radiated, Left arm palliative, Radiated to lumbar spine   Last week started on Immunotherapy Opdivo     percocet oxycodone 5mg   dexath  folic acid  Tobacco: quit This is a 76-year-old female with vascular dementia, poor historian whereby patient's history was supposed to be a history of lung cancer diagnosed approximately 2 years ago treated with chemoradiation with subsequent progression of disease requiring palliative RT to various areas including left arm due to extensive bony metastasis as well as to lumbar spine most recently approximately 1-2 weeks ago by Dr. Calderón.  The patient also received an initial infusion of Opdivo presents today after 1 week history of recurrent falls.  Earlier today the patient noted that she had fallen which her left lower extremity gave out underneath her.  Patient denied any back pain patient denies any urinary incontinence and stool incontinence and has been able to intermittently walk and bear weight on the left lower extremity.  Emergency department patient underwent MRI of the thoracic spine in which significant for numerous osseous metastasis involving the thoracic T4-T5 with no pathological vertebral body compression or neoplastic encroachment into the spinal the 2.8 mm posterior protrusion contributing to slight central canal stenosis at T7-T8 with no word compression yet the report suggested cord impression. The patient was unable to tolerate The MRI C spine and MRI due to pain and the study was interupted.   The patient  had previously cervical spine fusion C5 several years ago   ONC: Dr. Warren   Radiation: Dr. Calderón   diagnosed: 2 years ago NSCLC   Treatment: Chemo+ Radiation Left arm palliative, Radiated to lumbar spine   will need to obtain records  Last week started on Immunotherapy Opdivo This is a 76-year-old female with vascular dementia, poor historian whereby she was diagnosed with lung cancer NSCLC diagnosed approximately 2 years ago treated with chemoradiation with subsequent progression of disease requiring palliative RT to various areas including left arm due to extensive bony metastasis as well as to lumbar spine most recently approximately 1-2 weeks ago by Dr. Calderón.  The patient also received an initial infusion of Opdivo presents today after 1 week history of recurrent falls.  Earlier today the patient noted that she had fallen which her left lower extremity gave out underneath her.  Patient denied any back pain patient denies any urinary incontinence and stool incontinence and has been able to intermittently walk and bear weight on the left lower extremity.  Emergency department patient underwent MRI of the thoracic spine in which significant for numerous osseous metastasis involving the thoracic T4-T5 with no pathological vertebral body compression or neoplastic encroachment into the spinal the 2.8 mm posterior protrusion contributing to slight central canal stenosis at T7-T8 with no cord compression yet the report suggested cord impression. The patient was unable to tolerate The MRI C spine and MRI brain due to pain and the study was interupted.   The patient  had previously cervical spine fusion C5 several years ago   ONC: Dr. Warren   Radiation: Dr. Calderón   diagnosed: 2 years ago NSCLC   Treatment: Chemo+ Radiation Left arm palliative, Radiated to lumbar spine   will need to obtain records  Last week started on Immunotherapy Opdivo

## 2017-11-29 NOTE — PROGRESS NOTE ADULT - ASSESSMENT
A/P    #Recurrent fall  -MRI of brain and cervical spine to follow   -neurology evaluation appreciated   -ct steroids for possible cord compression till MRI   -pain control, fall precaution     #lung cancer- out pt management     #dvt pr     #Above discussed with pt and pt's  at bedside and all questions have been answered A/P    #Recurrent fall  -MRI of brain and cervical spine to follow   -neurology evaluation appreciated   -ct steroids for possible cord compression till MRI   -pain control, fall precaution     #Anemia- appears chronic in nature but if repeat one drops below 7, will transfuse one unit of prbc     #lung cancer- out pt management     #dvt pr     #Above discussed with pt and pt's  at bedside and all questions have been answered

## 2017-11-29 NOTE — PROGRESS NOTE ADULT - SUBJECTIVE AND OBJECTIVE BOX
HPI:  This is a 76-year-old female admitted with c/o recurrent fall at home , no back pain patient no any urinary incontinence and stool incontinence and has been able to intermittently walk and bear weight on the left lower extremity.    PMH- vascular dementia, poor historian whereby she was diagnosed with lung cancer NSCLC diagnosed approximately 2 years ago treated with chemoradiation with subsequent progression of disease requiring palliative RT to various areas including left arm due to extensive bony metastasis as well as to lumbar spine most recently approximately 1-2 weeks ago by Dr. Calderón.       #Review of system- rest of review of systems are negative except as mentioned in HPI    PHYSICAL EXAM:    Vital Signs Last 24 Hrs  T(C): 37.1 (2017 13:33), Max: 37.2 (2017 05:07)  T(F): 98.8 (2017 13:33), Max: 98.9 (2017 05:07)  HR: 88 (2017 13:33) (80 - 98)  BP: 101/68 (2017 13:33) (101/68 - 135/61)  BP(mean): --  RR: 17 (2017 13:33) (16 - 18)  SpO2: 96% (2017 13:33) (96% - 100%)    GENERAL: comfortable   HEAD:  Atraumatic, Normocephalic  EYES: EOMI, PERRLA, conjunctiva and sclera clear  HEENT: Moist mucous membranes  NECK: Supple, No JVD  NERVOUS SYSTEM:  Alert & Oriented X3,  CHEST/LUNG: Clear to auscultation bilaterally; No rales, rhonchi, wheezing, or rubs  HEART:S1S2 normal, no murmer  ABDOMEN: Soft, Nontender, Nondistended; Bowel sounds present  GENITOURINARY- Voiding, no palpable bladder  EXTREMITIES:  2+ Peripheral Pulses, No clubbing, cyanosis, or edema  MUSCULOSKELETAL No muscle tenderness, Muscle tone normal, No joint tenderness, no Joint swelling,  SKIN-no rash, no lesion  PSYCH- Mood stable  LYMPH Node- No palpable lymph node    LABS:                        7.0    4.9   )-----------( 289      ( 2017 06:29 )             20.8         135  |  100  |  8   ----------------------------<  99  4.0   |  29  |  0.62    Ca    8.8      2017 06:29  Mg     1.5         TPro  6.7  /  Alb  2.5<L>  /  TBili  0.3  /  DBili  x   /  AST  19  /  ALT  13  /  AlkPhos  142<H>  11    PT/INR - ( 2017 16:44 )   PT: 12.8 sec;   INR: 1.18 ratio         PTT - ( 2017 16:44 )  PTT:28.3 sec  Urinalysis Basic - ( 2017 00:01 )    Color: Yellow / Appearance: Clear / S.015 / pH: x  Gluc: x / Ketone: Negative  / Bili: Negative / Urobili: Negative mg/dL   Blood: x / Protein: 15 mg/dL / Nitrite: Negative   Leuk Esterase: Small / RBC: 0-2 /HPF / WBC 6-10   Sq Epi: x / Non Sq Epi: Few / Bacteria: Few        CAPILLARY BLOOD GLUCOSE    Standing medicine  dexamethasone  Injectable 4 milliGRAM(s) IV Push every 6 hours  enoxaparin Injectable 40 milliGRAM(s) SubCutaneous every 24 hours  HYDROmorphone  Injectable 0.5 milliGRAM(s) IV Push once  influenza   Vaccine 0.5 milliLiter(s) IntraMuscular once  levothyroxine 112 MICROGram(s) Oral daily  LORazepam   Injectable 0.5 milliGRAM(s) IV Push once  metoprolol succinate ER 50 milliGRAM(s) Oral daily  oxyCODONE    IR 5 milliGRAM(s) Oral every 6 hours PRN  pantoprazole    Tablet 40 milliGRAM(s) Oral two times a day before meals  sertraline 50 milliGRAM(s) Oral daily  sodium chloride 0.9%. 1000 milliLiter(s) IV Continuous <Continuous>

## 2017-11-29 NOTE — CONSULT NOTE ADULT - SUBJECTIVE AND OBJECTIVE BOX
Patient is a 76y old  Female who presents with a chief complaint of Pt keep falling (29 Nov 2017 01:24) and Information obtained from       HPI:  This is a 76-year-old female with vascular dementia, poor historian whereby she was diagnosed with lung cancer NSCLC diagnosed approximately 2 years ago treated with chemoradiation with subsequent progression of disease requiring palliative RT to various areas including left arm, Sternum, due to extensive bony metastasis as well as to lumbar spine most recently approximately 1-2 weeks ago by Dr. Calderón.  The patient also received an initial infusion of Opdivo Immunotherapy presents today after 1 week history of recurrent falls.  Earlier Yesterday the patient noted that she had fallen which her left lower extremity gave out underneath her.  Patient denied any back pain patient denies any urinary incontinence and stool incontinence and has been able to intermittently walk and bear weight on the left lower extremity. Was using walker for 2-3 days PTA per .  Emergency department patient underwent MRI of the thoracic spine in which significant for numerous osseous metastasis involving the thoracic T4-T5 with no pathological vertebral body compression or neoplastic encroachment into the spinal the 2.8 mm posterior protrusion contributing to slight central canal stenosis at T7-T8 with no cord compression yet the report suggested cord impression. The patient was unable to tolerate The MRI C spine and MRI brain due to pain and the study was interupted.   The patient  had previously cervical spine fusion C5 several years ago . Dx with vascular Dementia  under care of Dr. Simon at [facility].  ONC: Dr. Warren   Radiation: Dr. Calderón   diagnosed: 2 years ago NSCLC   Treatment: Chemo+ Radiation Left arm palliative, Radiated to lumbar spine   will need to obtain records  Last week started on Immunotherapy Opdivo (29 Nov 2017 01:24)      PAST MEDICAL & SURGICAL HISTORY:  Vitamin B12 deficiency  Intervertebral disc disorder: S/P cervical fusion  Osteoporosis  Lung cancer  Macular degeneration: left eye  Hypothyroidism, unspecified type  HTN (hypertension)  History of facial surgery: right cheekbone surgery - 06/2017  History of lung surgery: right lung nodules removed 2015  H/O umbilical hernia repair  S/P breast lumpectomy  H/O spinal fusion: anterior cervical fusion 2x   15 yrs ago, 2014  H/O oophorectomy: Right side      FAMILY HISTORY:  No pertinent family history in first degree relatives      Social Hx:  Nonsmoker, no drug or alcohol use    MEDICATIONS  (STANDING):  dexamethasone  Injectable 4 milliGRAM(s) IV Push every 6 hours  enoxaparin Injectable 40 milliGRAM(s) SubCutaneous every 24 hours  HYDROmorphone  Injectable 0.5 milliGRAM(s) IV Push once  levothyroxine 112 MICROGram(s) Oral daily  metoprolol succinate ER 50 milliGRAM(s) Oral daily  sertraline 50 milliGRAM(s) Oral daily  sodium chloride 0.9%. 1000 milliLiter(s) (50 mL/Hr) IV Continuous <Continuous>       Allergies    erythromycin (Unknown)  latex (Rash)        ROS: Pertinent positives in HPI, all other ROS were reviewed and are negative.      Vital Signs Last 24 Hrs  T(C): 37.1 (29 Nov 2017 06:18), Max: 37.2 (29 Nov 2017 05:07)  T(F): 98.7 (29 Nov 2017 06:18), Max: 98.9 (29 Nov 2017 05:07)  HR: 91 (29 Nov 2017 06:18) (68 - 98)  BP: 121/57 (29 Nov 2017 06:18) (109/47 - 135/61)  BP(mean): --  RR: 16 (29 Nov 2017 06:18) (16 - 18)  SpO2: 97% (29 Nov 2017 06:18) (96% - 100%)        Constitutional: awake and alert, forgetful.  HEENT: PERRLA, EOMI,   Neck: Supple.  Respiratory: Breath sounds are clear bilaterally  Cardiovascular: S1 and S2, regular  rhythm  Gastrointestinal: soft, nontender  Extremities:  no edema  Vascular:  Carotid Bruit - no  Musculoskeletal: no joint swelling , c/o pain all over.,  Skin: No rashes    Neurological exam:  HF: A x O x 2 forgetful, poor historan. No Aphasia.  CN: NOEMI, EOMI, VFF, facial sensation normal, no NLFD, tongue midline,, difficulty swallowing per .  Motor: No pronator drift, c/o pain left UE weakness general Left LE> Rt LE 4/5 proximal > distal .    Sens:  sensory loss left LE without level     Reflexes:  Brisk reflexes BLE > UE +3 with few beats of Clonus , up going  toes b/l  Coord:   not able to test    Gait/Balance: Cannot test        Labs:   11-29    135  |  100  |  8   ----------------------------<  99  4.0   |  29  |  0.62    Ca    8.8      29 Nov 2017 06:29  Mg     1.5     11-29    TPro  6.7  /  Alb  2.5<L>  /  TBili  0.3  /  DBili  x   /  AST  19  /  ALT  13  /  AlkPhos  142<H>  11-29                              7.0    4.9   )-----------( 289      ( 29 Nov 2017 06:29 )             20.8       Radiology:  - CT Head:11/28/17    IMPRESSION:    No acute intracranial hemorrhage, mass effect, or evidence of acute   territorial infarct.   If clinical symptoms persist, recommend follow-up imaging with MRI brain   if there are no contraindications.      MRI Thoracic spine and LS spine  11/28/17    No official reports in PAC system [facility] report multiple osseuos mets in Thoracic and LS spine without cord compression  trung T4-5 and L1 level .

## 2017-11-29 NOTE — CONSULT NOTE ADULT - ASSESSMENT
Assessment:  · Assessment		  Impression: This is a 76 year old female with Metastatic Lung Ca presently on Opdivo now admitted with recurrent falls possibly due to  BLE weakness Left > RT LE   R/o Spine/Brain mets.  REC MRI brain / c spine with contrast pending.    Neurosurgery has reviewed the MRI per ED.  will empirically start Corticosteroids 10mg followed by  4mg IV q 6 hours    will require analgesic prior to study 0.5mg of Dilaudid prior.  Case discussed with Pt's .  will f/u.  F/u with  and .

## 2017-11-29 NOTE — CONSULT NOTE ADULT - SUBJECTIVE AND OBJECTIVE BOX
HPI:  This is a 76-year-old female with vascular dementia, poor historian whereby she was diagnosed with lung cancer NSCLC diagnosed approximately 2 years ago treated with chemoradiation with subsequent progression of disease requiring palliative RT to various areas including left arm due to extensive bony metastasis as well as to lumbar spine most recently approximately 1-2 weeks ago by Dr. Calderón.      The patient received palliative systemic chemotherapy with Alimta and carboplatin earlier this year and recently changed to Opdivo - s/p first infusion one week ago.  Presented to ER with c/o recurrent falls.  Earlier today the patient noted that she had fallen which her left lower extremity gave out underneath her.   She has intermittent back pain- at different levels- this is not new.  The takes Percocet for pain- on average 2-3 tabl per day.   She denies urinary incontinence and stool incontinence and has been able to intermittently walk and bear weight on the left lower extremity.  Emergency department patient underwent MRI of the thoracic spine in which significant for numerous osseous metastasis involving the thoracic T4-T5 with no pathological vertebral body compression or neoplastic encroachment into the spinal the 2.8 mm posterior protrusion contributing to slight central canal stenosis at T7-T8 with no cord compression yet the report suggested cord impression. The patient was unable to tolerate The MRI C spine and MRI brain due to pain and the study was interrupted.   The patient  had previously cervical spine fusion C5 several years ago   ONC: Dr. Warren   Radiation: Dr. Calderón   Her oncologic history obtained from patient's significant other. I don not have access to her outpatient records.    Feels comfortable now. Does not remember if she got out of bed today. Awaiting brain MRI.    PAST MEDICAL & SURGICAL HISTORY:  Vitamin B12 deficiency  Intervertebral disc disorder: S/P cervical fusion  Osteoporosis  Lung cancer  Macular degeneration: left eye  Hypothyroidism, unspecified type  HTN (hypertension)  History of facial surgery: right cheekbone surgery - 06/2017  History of lung surgery: right lung nodules removed 2015  H/O umbilical hernia repair  S/P breast lumpectomy  H/O spinal fusion: anterior cervical fusion 2x   15 yrs ago, 2014  H/O oophorectomy: Right side      FAMILY HISTORY:  No pertinent family history in first degree relatives    Social hx; former smoker, lives with significant other     ROS : weight loss- gradual over several months, no dyspnea, diffuse bone pains, walks with walker at home     Vital Signs Last 24 Hrs  T(C): 37.1 (29 Nov 2017 13:33), Max: 37.2 (29 Nov 2017 05:07)  T(F): 98.8 (29 Nov 2017 13:33), Max: 98.9 (29 Nov 2017 05:07)  HR: 88 (29 Nov 2017 13:33) (80 - 98)  BP: 101/68 (29 Nov 2017 13:33) (101/68 - 135/61)  BP(mean): --  RR: 17 (29 Nov 2017 13:33) (16 - 18)  SpO2: 96% (29 Nov 2017 13:33) (96% - 98%)      Elderly frail looking woman in bed. Looks comfortable.  Chest clear. Heart regular. Abdomen soft. No leg edema. Neuro : awake, alert , oriented to person and place ; has mild dementia  moving all extremities, no overt focal deficits.  Skin- few scattered small subcutaneous nodules - possible mets.                          7.5    4.7   )-----------( 308      ( 29 Nov 2017 17:16 )             22.0   11-29    135  |  100  |  8   ----------------------------<  99  4.0   |  29  |  0.62    Ca    8.8      29 Nov 2017 06:29  Mg     1.5     11-29    TPro  6.7  /  Alb  2.5<L>  /  TBili  0.3  /  DBili  x   /  AST  19  /  ALT  13  /  AlkPhos  142<H>  11-29    < from: MR Thoracic Spine No Cont (11.29.17 @ 10:52) >    EXAM:  THORACIC SPINE(MRI)W O CON                          EXAM:  MR SPINE LUMBAR                            PROCEDURE DATE:  11/29/2017          INTERPRETATION:  Exam Date: 11/28/2017     MR thoracic and lumbar  without gadolinium     CLINICAL INDICATION: History of metastatic lung cancer. left le weakness    TECHNIQUE:   Sagittal T1-weighted, T2-weighted, and inversion recovery   images, and axial T1 and T2-weighted images of the thoracic and lumbar   spine were obtained.         FINDINGS:   No prior similar studies are available for review.         Numerous abnormal T1 hypointense/T2 hyperintense lesions throughout the   thoracic and lumbar spine and visualized portion of the bony pelvis,   compatible with multilevel multifocal metastases. There is mild inferior   endplate height loss at T9, may reflect degenerative change versus mild   compression fracture. There is mild superior endplate height loss at L1,   most suggestive of a mild pathologic compression fracture.    There is mild thoracic kyphosis.    No significant thoracic foraminal or central spinal canal stenosis.    Multilevel disc bulging within the lumbar spine resulting in multilevel   mild neural foraminal narrowing. No significant spinal canal stenosis.    The spinal cord maintains intact morphology.  Cord signal intensity is   intact.    Mass lesion in the right lower hilar region, compatible with patient's   known primary malignancy, is again identified, better evaluated on the   recent CT chest.    Numerous T2 hyperintense lesions within the liver, most suggestive of   hepatic metastases.      IMPRESSION:      Multilevel multifocal metastases of the thoracic and lumbar spine and   bony pelvis.  Mild inferior endplate height loss at T9, may reflect   degenerative change versus mild compression fracture. Mild superior   endplate height loss at L1, most suggestive of a mild pathologic   compression fracture. No significant spinal canal stenosis of the   thoracic lumbar spine.    Mass lesion in the rightlower hilar region, compatible with patient's   known primary malignancy, is again identified, better evaluated on the   recent CT chest.    Numerous T2 hyperintense lesions within the liver, most suggestive of   hepatic metastases.        KELLEE BARBOSA M.D., ATTENDING RADIOLOGIST  This document has been electronically signed. Nov 29 2017 11:42AM        MEDICATIONS  (STANDING):  dexamethasone  Injectable 4 milliGRAM(s) IV Push every 6 hours  enoxaparin Injectable 40 milliGRAM(s) SubCutaneous every 24 hours  HYDROmorphone  Injectable 0.5 milliGRAM(s) IV Push once  influenza   Vaccine 0.5 milliLiter(s) IntraMuscular once  levothyroxine 112 MICROGram(s) Oral daily  LORazepam   Injectable 0.5 milliGRAM(s) IV Push once  metoprolol succinate ER 50 milliGRAM(s) Oral daily  pantoprazole    Tablet 40 milliGRAM(s) Oral two times a day before meals  sertraline 50 milliGRAM(s) Oral daily  sodium chloride 0.9%. 1000 milliLiter(s) (50 mL/Hr) IV Continuous <Continuous>    MEDICATIONS  (PRN):  oxyCODONE    IR 5 milliGRAM(s) Oral every 6 hours PRN Severe Pain (7 - 10)

## 2017-11-29 NOTE — ED ADULT NURSE REASSESSMENT NOTE - COMFORT CARE
darkened lights/hourly rounding completed/side rails up/repositioned
repositioned/side rails up/hourly rounding completed
wait time explained

## 2017-11-29 NOTE — ED ADULT NURSE REASSESSMENT NOTE - GENERAL PATIENT STATE
cooperative/comfortable appearance
cooperative/resting/sleeping/family/SO at bedside/comfortable appearance
comfortable appearance

## 2017-11-29 NOTE — H&P ADULT - ASSESSMENT
Impression: This is a 76 year old female with Metastatic Lung Ca presently on Opdivo now admitted with recurrent falls possibly due to UMN lesion     Neuro: Recurrent Falls   Neurosurgery has reviewed the MRI ( per ED physician) no apparent cord compression T/L spine full consult to follow by Dr. Villarreal   will empirically start Corticosteroids 10mg followed by  4mg IV q 6 hours   given Neuro findings will require MRI BRAIN and C spine due to suspicion of UMN lesion     Pain:   will continue with oxycodone 5mg q 4 hours prn pain     Oncology: Lung Cancer   treated last week with Opdivo   would check CEA   Consult Dr. Warren    Cardiovascular: HTN   Continue with Losartan     GI: Elevated ALP likely secondary to osseous metastases   will continue to trend   MRI of Spine suggested new Liver metastases    Heme: Anemia normocytic   check retic count   concern for myelophthisic picture given progressive disease     DVt ppx: Lovenox     CODE STATUS: Discussed with Spouse and as per Living will which will be brought in tomorrow DNr/DNI Impression: This is a 76 year old female with Metastatic Lung Ca presently on Opdivo now admitted with recurrent falls possibly due to UMN lesion     Neuro: Recurrent Falls   Neurosurgery has reviewed the MRI ( per ED physician) no apparent cord compression T/L spine full consult to follow by Dr. Villarreal   will empirically start Corticosteroids 10mg followed by  4mg IV q 6 hours   given Neuro findings will require MRI BRAIN and C spine due to suspicion of UMN lesion   will require analgesic prior to study 0.5mg of Dilaudid prior    Pain:   will continue with oxycodone 5mg q 4 hours prn pain     Oncology: Lung Cancer   treated last week with Opdivo   would check CEA   Consult Dr. Warren    Cardiovascular: HTN   Continue with Losartan     GI: Elevated ALP likely secondary to osseous metastases   will continue to trend   MRI of Spine suggested new Liver metastases    Heme: Anemia normocytic   check retic count   concern for myelophthisic picture given progressive disease     DVt ppx: Lovenox     CODE STATUS: Discussed with Spouse and as per Living will which will be brought in tomorrow DNr/DNI

## 2017-11-29 NOTE — CONSULT NOTE ADULT - ASSESSMENT
77 y/o woman with metastatic non small cell lung cancer s/o palliative chemotherapy with Alimta and carboplatin and recently started on  second line therapy with Opdivo , s/p palliative radiation to  multiple areas of bone mets. Overall declining performance status. Admitted s/p fall at home- due to pain/?  radicular sx / thoracic spine involvement.  She is a  candidate for palliative RT to Thoracic spine.  Await brain MRI r/o brain mets.  Palliative care consultation.  Dr Warren is away- she will be back next week Monday.   Patient will follow up with Dr Warren- who will  evaluate and decide if patient should continue Opdivo versus home  hospice.

## 2017-11-29 NOTE — CONSULT NOTE ADULT - ASSESSMENT
75 yo with widely metastatic lung cancer who has received multiple courses of palliative xrt over past few months.  Pt has known base of skull involvment and was switched to Optivo several weeks ago.  She has multiple sites of pain and MRI Tspine reviewed. This area has not been previously treated with xrt and pt can be offered a short course of tx following discharge.  Prognosis poor and rec consult by Dr. Warren.

## 2017-11-29 NOTE — CONSULT NOTE ADULT - SUBJECTIVE AND OBJECTIVE BOX
Patient is a 76y old  Female who presents with a chief complaint of I keep falling (29 Nov 2017 01:24)      HPI:  This is a 76-year-old female with vascular dementia, poor historian whereby she was diagnosed with lung cancer NSCLC diagnosed approximately 2 years ago treated with chemoradiation with subsequent progression of disease requiring palliative RT to various areas including left arm due to extensive bony metastasis as well as to lumbar spine most recently approximately 1-2 weeks ago by Dr. Calderón.  The patient also received an initial infusion of Opdivo presents today after 1 week history of recurrent falls.  Earlier today the patient noted that she had fallen which her left lower extremity gave out underneath her.  Patient denied any back pain patient denies any urinary incontinence and stool incontinence and has been able to intermittently walk and bear weight on the left lower extremity.  Emergency department patient underwent MRI of the thoracic spine in which significant for numerous osseous metastasis involving the thoracic T4-T5 with no pathological vertebral body compression or neoplastic encroachment into the spinal the 2.8 mm posterior protrusion contributing to slight central canal stenosis at T7-T8 with no cord compression yet the report suggested cord impression. The patient was unable to tolerate The MRI C spine and MRI brain due to pain and the study was interupted.   The patient  had previously cervical spine fusion C5 several years ago .  She has been treated with palliative xrt to multiple sites (not t spine).  She was treated to Lspine,l left biceps, sternum and left base of skull.    ONC: Dr. Warren   Radiation: Dr. Calderón   diagnosed: 2 years ago NSCLC   Treatment: Chemo+ Radiation Left arm palliative, Radiated to lumbar spine   will need to obtain records  Last week started on Immunotherapy Opdivo (29 Nov 2017 01:24)      PAST MEDICAL & SURGICAL HISTORY:  Vitamin B12 deficiency  Intervertebral disc disorder: S/P cervical fusion  Osteoporosis  Lung cancer  Macular degeneration: left eye  Hypothyroidism, unspecified type  HTN (hypertension)  History of facial surgery: right cheekbone surgery - 06/2017  History of lung surgery: right lung nodules removed 2015  H/O umbilical hernia repair  S/P breast lumpectomy  H/O spinal fusion: anterior cervical fusion 2x   15 yrs ago, 2014  H/O oophorectomy: Right side      MEDICATIONS  (STANDING):  dexamethasone  Injectable 4 milliGRAM(s) IV Push every 6 hours  enoxaparin Injectable 40 milliGRAM(s) SubCutaneous every 24 hours  HYDROmorphone  Injectable 0.5 milliGRAM(s) IV Push once  levothyroxine 112 MICROGram(s) Oral daily  metoprolol succinate ER 50 milliGRAM(s) Oral daily  sertraline 50 milliGRAM(s) Oral daily  sodium chloride 0.9%. 1000 milliLiter(s) (50 mL/Hr) IV Continuous <Continuous>    MEDICATIONS  (PRN):  oxyCODONE    IR 5 milliGRAM(s) Oral every 6 hours PRN Severe Pain (7 - 10)      PHYSICAL EXAM:  Vital Signs Last 24 Hrs  T(C): 37.1 (29 Nov 2017 06:18), Max: 37.2 (29 Nov 2017 05:07)  T(F): 98.7 (29 Nov 2017 06:18), Max: 98.9 (29 Nov 2017 05:07)  HR: 91 (29 Nov 2017 06:18) (68 - 98)  BP: 121/57 (29 Nov 2017 06:18) (109/47 - 135/61)  BP(mean): --  RR: 16 (29 Nov 2017 06:18) (16 - 18)  SpO2: 97% (29 Nov 2017 06:18) (96% - 100%)  Head: no alopecia or scars  Neck: no palpable lymphadenopathy  Lungs: Clear to ascultation bilaterally and no wheezes  Cardiac: normal S1, S2, no murmurs  Abdomen: soft, nontender with no ascites  Extremities: no peripheral edema, good pulses bilaterally  Neuro: A & O, CNs II-XII intact. Motor strength 5/5 throughout and strength 5/5 throughout. Ambulatory with limp    LABS:  CBC Full  -  ( 29 Nov 2017 06:29 )  WBC Count : 4.9 K/uL  Hemoglobin : 7.0 g/dL  Hematocrit : 20.8 %  Platelet Count - Automated : 289 K/uL  Mean Cell Volume : 95.8 fl  Mean Cell Hemoglobin : 32.4 pg  Mean Cell Hemoglobin Concentration : 33.8 gm/dL  Auto Neutrophil # : 3.4 K/uL  Auto Lymphocyte # : 0.7 K/uL  Auto Monocyte # : 0.7 K/uL  Auto Eosinophil # : 0.0 K/uL  Auto Basophil # : 0.1 K/uL  Auto Neutrophil % : 68.6 %  Auto Lymphocyte % : 14.1 %  Auto Monocyte % : 15.0 %  Auto Eosinophil % : 0.9 %  Auto Basophil % : 1.3 %    11-29    135  |  100  |  8   ----------------------------<  99  4.0   |  29  |  0.62    Ca    8.8      29 Nov 2017 06:29  Mg     1.5     11-29    TPro  6.7  /  Alb  2.5<L>  /  TBili  0.3  /  DBili  x   /  AST  19  /  ALT  13  /  AlkPhos  142<H>  11-29

## 2017-11-30 PROCEDURE — 99233 SBSQ HOSP IP/OBS HIGH 50: CPT

## 2017-11-30 RX ADMIN — Medication 4 MILLIGRAM(S): at 14:16

## 2017-11-30 RX ADMIN — OXYCODONE HYDROCHLORIDE 5 MILLIGRAM(S): 5 TABLET ORAL at 22:30

## 2017-11-30 RX ADMIN — PANTOPRAZOLE SODIUM 40 MILLIGRAM(S): 20 TABLET, DELAYED RELEASE ORAL at 16:12

## 2017-11-30 RX ADMIN — Medication 4 MILLIGRAM(S): at 22:15

## 2017-11-30 RX ADMIN — SERTRALINE 50 MILLIGRAM(S): 25 TABLET, FILM COATED ORAL at 11:51

## 2017-11-30 RX ADMIN — Medication 4 MILLIGRAM(S): at 09:09

## 2017-11-30 RX ADMIN — Medication 1 DROP(S): at 22:29

## 2017-11-30 RX ADMIN — OXYCODONE HYDROCHLORIDE 5 MILLIGRAM(S): 5 TABLET ORAL at 22:15

## 2017-11-30 RX ADMIN — Medication 50 MILLIGRAM(S): at 05:41

## 2017-11-30 RX ADMIN — Medication 112 MICROGRAM(S): at 05:41

## 2017-11-30 RX ADMIN — ENOXAPARIN SODIUM 40 MILLIGRAM(S): 100 INJECTION SUBCUTANEOUS at 11:51

## 2017-11-30 RX ADMIN — PANTOPRAZOLE SODIUM 40 MILLIGRAM(S): 20 TABLET, DELAYED RELEASE ORAL at 09:09

## 2017-11-30 RX ADMIN — SODIUM CHLORIDE 50 MILLILITER(S): 9 INJECTION INTRAMUSCULAR; INTRAVENOUS; SUBCUTANEOUS at 03:33

## 2017-11-30 RX ADMIN — Medication 4 MILLIGRAM(S): at 03:33

## 2017-11-30 NOTE — PROGRESS NOTE ADULT - SUBJECTIVE AND OBJECTIVE BOX
HPI:  This is a 76-year-old female admitted with c/o recurrent fall at home , no back pain patient no any urinary incontinence and stool incontinence and has been able to intermittently walk and bear weight on the left lower extremity.    PMH- vascular dementia, poor historian whereby she was diagnosed with lung cancer NSCLC diagnosed approximately 2 years ago treated with chemoradiation with subsequent progression of disease requiring palliative RT to various areas including left arm due to extensive bony metastasis as well as to lumbar spine most recently approximately 1-2 weeks ago by Dr. Calderón.     : Pt forgetful/confused at bedside and unable to answer basic questions.  When I discussed MRI findings of advanced disease in spine she became very tearful/upset.    was present at bedside.     REVIEW OF SYSTEMS: All other review of systems is negative unless indicated above.    Vital Signs Last 24 Hrs  T(C): 36.9 (2017 14:32), Max: 36.9 (2017 14:32)  T(F): 98.5 (2017 14:32), Max: 98.5 (2017 14:32)  HR: 90 (2017 14:32) (87 - 90)  BP: 132/64 (2017 14:32) (130/64 - 132/64)  BP(mean): --  RR: 17 (2017 14:32) (16 - 17)  SpO2: 97% (2017 14:32) (97% - 97%)    GENERAL: comfortable, confused  HEAD:  Atraumatic, Normocephalic  EYES: EOMI, PERRLA, conjunctiva and sclera clear  HEENT: Moist mucous membranes  NECK: Supple, No JVD  NERVOUS SYSTEM:  Alert & Oriented X3,  CHEST/LUNG: Clear to auscultation bilaterally; No rales, rhonchi, wheezing, or rubs  HEART:S1S2 normal, no murmer  ABDOMEN: Soft, Nontender, Nondistended; Bowel sounds present  GENITOURINARY- Voiding, no palpable bladder  EXTREMITIES:  2+ Peripheral Pulses, No clubbing, cyanosis, or edema  MUSCULOSKELETAL No muscle tenderness, Muscle tone normal, No joint tenderness, no Joint swelling,  SKIN-no rash, no lesion  PSYCH- Mood stable  LYMPH Node- No palpable lymph node    MEDICATIONS  (STANDING):  dexamethasone  Injectable 4 milliGRAM(s) IV Push every 6 hours  enoxaparin Injectable 40 milliGRAM(s) SubCutaneous every 24 hours  HYDROmorphone  Injectable 0.5 milliGRAM(s) IV Push once  influenza   Vaccine 0.5 milliLiter(s) IntraMuscular once  levothyroxine 112 MICROGram(s) Oral daily  metoprolol succinate ER 50 milliGRAM(s) Oral daily  pantoprazole    Tablet 40 milliGRAM(s) Oral two times a day before meals  sertraline 50 milliGRAM(s) Oral daily  sodium chloride 0.9%. 1000 milliLiter(s) (50 mL/Hr) IV Continuous <Continuous>    MEDICATIONS  (PRN):  oxyCODONE    IR 5 milliGRAM(s) Oral every 6 hours PRN Severe Pain (7 - 10)                              7.5    4.7   )-----------( 308      ( 2017 17:16 )             22.0         135  |  100  |  8   ----------------------------<  99  4.0   |  29  |  0.62    Ca    8.8      2017 06:29  Mg     1.5         TPro  6.7  /  Alb  2.5<L>  /  TBili  0.3  /  DBili  x   /  AST  19  /  ALT  13  /  AlkPhos  142<H>      CAPILLARY BLOOD GLUCOSE        LIVER FUNCTIONS - ( 2017 06:29 )  Alb: 2.5 g/dL / Pro: 6.7 gm/dL / ALK PHOS: 142 U/L / ALT: 13 U/L / AST: 19 U/L / GGT: x             Urinalysis Basic - ( 2017 00:01 )    Color: Yellow / Appearance: Clear / S.015 / pH: x  Gluc: x / Ketone: Negative  / Bili: Negative / Urobili: Negative mg/dL   Blood: x / Protein: 15 mg/dL / Nitrite: Negative   Leuk Esterase: Small / RBC: 0-2 /HPF / WBC 6-10   Sq Epi: x / Non Sq Epi: Few / Bacteria: Few        Assessment and Plan:  76 year old admitted for recurrent fall and leg weakness found to have metastatic lung ca to entire spine.    Recurrent fall secondary to metastatic lung ca to entire spine.  -MRI --> mets to entire spine with compression fx of T4, T9, L1.  -neurology evaluation appreciated --> no cord compression  -supportive care with steroids which i will taper  -opioid therapy  -PT eval    #Anemia: Chronic disease  -monitor    #lung cancer with metastatic disease to bone  -f/u oncology concerning management --> opdivo vs home hospice.  -rad-onc --> can be offered a short course of tx following discharge.  Prognosis poor.  -palliative care consult    dvt ppx:  -lovenox    Attending Statement:  40 minutes spent on total encounter; more than 50% of the visit was spent counseling and/or coordinating care by the attending physician.

## 2017-11-30 NOTE — PROGRESS NOTE ADULT - SUBJECTIVE AND OBJECTIVE BOX
Referral/Consultation:   Initial Consult:  · Requested by Name:		  · Date/Time:	29-Nov-2017 10:19	  · Reason for Referral/Consultation:	Weakness Left LE/ Spinal mets with Lung CA	      · Subjective and Objective: 	  Patient is a 76y old  Female who presents with a chief complaint of Pt keep falling (29 Nov 2017 01:24) and Information obtained from       HPI:  This is a 76-year-old female with vascular dementia, poor historian whereby she was diagnosed with lung cancer NSCLC diagnosed approximately 2 years ago treated with chemoradiation with subsequent progression of disease requiring palliative RT to various areas including left arm, Sternum, due to extensive bony metastasis as well as to lumbar spine most recently approximately 1-2 weeks ago by Dr. Calderón.  The patient also received an initial infusion of Opdivo Immunotherapy presents today after 1 week history of recurrent falls.  Earlier Yesterday the patient noted that she had fallen which her left lower extremity gave out underneath her.  Patient denied any back pain patient denies any urinary incontinence and stool incontinence and has been able to intermittently walk and bear weight on the left lower extremity. Was using walker for 2-3 days PTA per .  Emergency department patient underwent MRI of the thoracic spine in which significant for numerous osseous metastasis involving the thoracic T4-T5 with no pathological vertebral body compression or neoplastic encroachment into the spinal the 2.8 mm posterior protrusion contributing to slight central canal stenosis at T7-T8 with no cord compression yet the report suggested cord impression. The patient was unable to tolerate The MRI C spine and MRI brain due to pain and the study was interrupted.   The patient  had previously cervical spine fusion C5 several years ago . Dx with vascular Dementia  under care of Dr. Simon at [facility].    11/30/17 : Pt sitting in bed eating dinner very Upset   by the bed side. They were told about the findings on mRI scans on spine and  were upset about the mets  in  T spine. Pt still C/o about left LE weakness .Brain MRI and C spine did not reveal any significant pathology. Waiting for PT and seen by Dr. Pittman as  was away on vacation. No bladder or bowel c/o except constipation since Dx of mets started chemo/RT on pain meds percocet.    ONC: Dr. Warren   Radiation: Dr. Calderón   diagnosed: 2 years ago NSCLC   Treatment: Chemo+ Radiation Left arm palliative, Radiated to lumbar spine   will need to obtain records  Last week started on Immunotherapy Opdivo (29 Nov 2017 01:24)  MEDICATIONS  (STANDING):  dexamethasone  Injectable 4 milliGRAM(s) IV Push every 6 hours  enoxaparin Injectable 40 milliGRAM(s) SubCutaneous every 24 hours  HYDROmorphone  Injectable 0.5 milliGRAM(s) IV Push once  influenza   Vaccine 0.5 milliLiter(s) IntraMuscular once  levothyroxine 112 MICROGram(s) Oral daily  metoprolol succinate ER 50 milliGRAM(s) Oral daily  pantoprazole    Tablet 40 milliGRAM(s) Oral two times a day before meals  sertraline 50 milliGRAM(s) Oral daily  sodium chloride 0.9%. 1000 milliLiter(s) (50 mL/Hr) IV Continuous <Continuous>      Vital Signs Last 24 Hrs  T(C): 36.9 (30 Nov 2017 14:32), Max: 36.9 (30 Nov 2017 14:32)  T(F): 98.5 (30 Nov 2017 14:32), Max: 98.5 (30 Nov 2017 14:32)  HR: 90 (30 Nov 2017 14:32) (87 - 90)  BP: 132/64 (30 Nov 2017 14:32) (130/64 - 132/64)  BP(mean): --  RR: 17 (30 Nov 2017 14:32) (16 - 17)  SpO2: 97% (30 Nov 2017 14:32) (97% - 97%)    Constitutional: awake and alert, forgetful.  HEENT: PERRLA, EOMI,   Neck: Supple.  Respiratory: Breath sounds are clear bilaterally  Cardiovascular: S1 and S2, regular  rhythm  Gastrointestinal: soft, nontender  Extremities:  no edema  Vascular:  Carotid Bruit - no  Musculoskeletal: no joint swelling , c/o pain all over.,  Skin: No rashes    Neurological exam:  HF: A x O x 2 forgetful, poor historan. No Aphasia.  CN: NOEMI, EOMI, VFF, facial sensation normal, no NLFD, tongue midline,, difficulty swallowing per .  Motor: No pronator drift, c/o pain left UE weakness general Left LE> Rt LE 4/5 proximal > distal .    Sens:  sensory loss left LE without level     Reflexes:  Brisk reflexes BLE > UE +3 with few beats of Clonus , up going  toes b/l  Coord:   not able to test    Gait/Balance: Cannot test                        7.5    4.7   )-----------( 308      ( 29 Nov 2017 17:16 )             22.0     11-29    135  |  100  |  8   ----------------------------<  99  4.0   |  29  |  0.62    Ca    8.8      29 Nov 2017 06:29  Mg     1.5     11-29    TPro  6.7  /  Alb  2.5<L>  /  TBili  0.3  /  DBili  x   /  AST  19  /  ALT  13  /  AlkPhos  142<H>  11-29          Radiology report:  - CT Head 11/28/17  IMPRESSION:    No acute intracranial hemorrhage, mass effect, or evidence of acute   territorial infarct.   If clinical symptoms persist, recommend follow-up imaging with MRI brain   if there are no contraindications.  - MRI brain11/29/17    IMPRESSION:   Mild periventricular, bifrontal and biparietal subcortical   and deep white matter ischemia. Old lacunar infarction seen in the LEFT   thalamus posteriorly and in the BILATERAL cerebellum.  Retention cysts   are seen in the RIGHT maxillary sinus.  2 cm hypoechoic lesion in the   LEFT parietal bone suspicious for osseous metastases.    MRI C spine 11/29/17    IMPRESSION:  Osseous metastatic lesion involves theLEFT articular body   of C4. There is pathologic compression fracture of T4 with loss of 20% of   vertebral body height but no benign bony retropulsion. Osseous metastatic   lesion involves the spinous process of T1 and the T5 vertebral body.   RIGHTposterior lung mass is seen in the upper thoracic spine abutting   the T4 and T5 vertebral bodies. A 1.5 cm nodule is seen in the   subcutaneous fat at the T1 level likely a metastatic nodule.      MRI Thoracic spine 11/28/17    IMPRESSION:      Multilevel multifocal metastases of the thoracic and lumbar spine and   bony pelvis.  Mild inferior endplate height loss at T9, may reflect   degenerative change versus mild compression fracture. Mild superior   endplate height loss at L1, most suggestive of a mild pathologic   compression fracture. No significant spinal canal stenosis of the   thoracic lumbar spine.    Mass lesion in the rightlower hilar region, compatible with patient's   known primary malignancy, is again identified, better evaluated on the   recent CT chest.    Numerous T2 hyperintense lesions within the liver, most suggestive of   hepatic metastases.

## 2017-11-30 NOTE — PROGRESS NOTE ADULT - ASSESSMENT
Assessment and Recommendation:   · Assessment		  Assessment:  · Assessment		  Impression: This is a 76 year old female with Metastatic Lung Ca presently on Opdivo now admitted with recurrent falls possibly due to  BLE weakness Left > RT LE    Spine  mets. If Pt c/o pain  ortho/NS consult for spine stabilization if Pt tolerates for T4/5 leision.  change steroids to Po  with 4mg  q 8 hours  and f/u Rt/Oncology taper.  Case discussed with Pt's  and Pt.  F/u with  and .  PT/OT as tolerated.  Needs SS consult.  will f/u.  Consider short term Rehab if pt willing.

## 2017-12-01 RX ORDER — ALPRAZOLAM 0.25 MG
0.25 TABLET ORAL
Qty: 0 | Refills: 0 | Status: DISCONTINUED | OUTPATIENT
Start: 2017-12-01 | End: 2017-12-05

## 2017-12-01 RX ORDER — OXYCODONE HYDROCHLORIDE 5 MG/1
5 TABLET ORAL EVERY 4 HOURS
Qty: 0 | Refills: 0 | Status: DISCONTINUED | OUTPATIENT
Start: 2017-12-01 | End: 2017-12-05

## 2017-12-01 RX ADMIN — Medication 1 DROP(S): at 17:11

## 2017-12-01 RX ADMIN — OXYCODONE HYDROCHLORIDE 5 MILLIGRAM(S): 5 TABLET ORAL at 11:17

## 2017-12-01 RX ADMIN — SERTRALINE 50 MILLIGRAM(S): 25 TABLET, FILM COATED ORAL at 15:22

## 2017-12-01 RX ADMIN — Medication 112 MICROGRAM(S): at 06:23

## 2017-12-01 RX ADMIN — Medication 50 MILLIGRAM(S): at 06:23

## 2017-12-01 RX ADMIN — Medication 0.25 MILLIGRAM(S): at 15:22

## 2017-12-01 RX ADMIN — Medication 4 MILLIGRAM(S): at 08:01

## 2017-12-01 RX ADMIN — Medication 4 MILLIGRAM(S): at 15:22

## 2017-12-01 RX ADMIN — Medication 4 MILLIGRAM(S): at 20:46

## 2017-12-01 RX ADMIN — Medication 1 DROP(S): at 06:23

## 2017-12-01 RX ADMIN — SODIUM CHLORIDE 50 MILLILITER(S): 9 INJECTION INTRAMUSCULAR; INTRAVENOUS; SUBCUTANEOUS at 00:43

## 2017-12-01 RX ADMIN — SODIUM CHLORIDE 50 MILLILITER(S): 9 INJECTION INTRAMUSCULAR; INTRAVENOUS; SUBCUTANEOUS at 20:43

## 2017-12-01 RX ADMIN — Medication 4 MILLIGRAM(S): at 02:40

## 2017-12-01 RX ADMIN — PANTOPRAZOLE SODIUM 40 MILLIGRAM(S): 20 TABLET, DELAYED RELEASE ORAL at 08:01

## 2017-12-01 RX ADMIN — ENOXAPARIN SODIUM 40 MILLIGRAM(S): 100 INJECTION SUBCUTANEOUS at 15:22

## 2017-12-01 NOTE — CONSULT NOTE ADULT - SUBJECTIVE AND OBJECTIVE BOX
HPI: Mrs. Blum is a 76y old Female coming from home with hx of vascular dementia (mild, poor historian), metastatic lung cancer NSCLC dx 2 years ago treated with chemoradiation (Dr. Warren, Dr. Calderón, on Alimta +carbo, changed to Opdivo 1 week prior to admission), with subsequent progression of disease requiring palliative RT admitted 11/28 for recurrent falls. Found to have MRI showing   numerous osseous metastasis throughout spine and MRI Head showing likely osseous mets to cranium and old ischemia. Palliative Care consulted to assist with establishing GOC.    Met Mrs. Blum this am, no family at bedside. Pt noting moderate pain in right deltoid region and behind head, aching, 7/10,. Also with pain in various parts of back with certain movements, none there at present. Pt unaware that she did not receive pain medication yet today for this, amenable to having this now. She notes recent hx of diarrhea, then constipation, now resolved with bowel regimen (unaware of what this is). Has no other physical complaints.     Pt able to recall few things about her care, becoming emotional easily when trying to remember. She is able to remember that two doctors gave her bad news about her health, but is unable to articulate that she has cancer or remember that she had any treatment for this in the past. She does note being told she will not survive the progression of what is ailing her, and was upset that this news was shared with her without her  present. When trying to get her to articulate what was said, she referred to spread of lumps in her back that she initially attributed to fall and said that the lumps were pieces of spine. Inquired with pt about her memory and she admitted that she forgets things often and that her  kept track of everything. Asked if she had any anxiety and she only endorsed having this when big topics are discussed without her  there to support her, also asked if she wanted to be apart of such discussions given this and her inability to remember everything. She said she would like to be apart of such a meeting as they make decisions together.  called at this time and we arranged meeting for today at 1pm.     When asked, pt was reluctant to say what she was hoping for, crying, saying that she knew it was not possible. With gentle probing she shared that she knows she will not be cured. Attempted to provide some emotional support, and she agreed that she would still have hope of not suffering and being able to spend as much time as possible with her family. We agreed to discuss this more with .      PAIN: (x )Yes   ( )No  Level: moderate  Location: left deltoid area   Intensity:   7/10  Quality: aching  Aggravating Factors: movement  Alleviating Factors: pain meds  Radiation: sometimes  Duration/Timing: intermittent  Impact on ADLs: yes    DYSPNEA: ( ) Yes  (x ) No  Level:    PAST MEDICAL & SURGICAL HISTORY:  Vitamin B12 deficiency  Intervertebral disc disorder: S/P cervical fusion  Osteoporosis  Lung cancer  Macular degeneration: left eye  Hypothyroidism, unspecified type  HTN (hypertension)  History of facial surgery: right cheekbone surgery - 06/2017  History of lung surgery: right lung nodules removed 2015  H/O umbilical hernia repair  S/P breast lumpectomy  H/O spinal fusion: anterior cervical fusion 2x   15 yrs ago, 2014  H/O oophorectomy: Right side      SOCIAL HX: Lives with ,  55 years, 3 children (one estranged), 11 grandchildren    Hx opiate tolerance (x )YES  ( )NO    Baseline ADLs  (Prior to Admission)- now dependent   (x ) Independent   ( )Dependent    FAMILY HISTORY:  No pertinent family history in first degree relatives      Review of Systems:    Anxiety- with certain conversations, otherwise ok  Depression- denies  Constipation- resolved  Diarrhea- resolved  Nausea- no  Vomiting- no  Anorexia- yes, thinks appetite picking up some  Weight Loss- unable to recall  Cough- denies  Secretions- denies  Fatigue- yes  Weakness- yes  Delirium- yes, at times    All other systems reviewed and negative      PHYSICAL EXAM:    Vital Signs Last 24 Hrs  T(C): 36.1 (01 Dec 2017 04:57), Max: 36.9 (30 Nov 2017 14:32)  T(F): 97 (01 Dec 2017 04:57), Max: 98.5 (30 Nov 2017 14:32)  HR: 71 (01 Dec 2017 04:57) (71 - 90)  BP: 129/51 (01 Dec 2017 04:57) (129/51 - 133/45)  BP(mean): --  RR: 17 (01 Dec 2017 04:57) (17 - 17)  SpO2: 97% (01 Dec 2017 04:57) (95% - 97%)  Daily     Daily     PPSV2:  30 %  FAST: 4    General: Elderly female, thin, sitting up in chair, pleasant, at times appropriately emotional, otherwise NAD  Mental Status: AOx2 (self and place, not time or circumstance)  HEENT: dmm, perrl, eomi, some temporal wasting  Lungs: dec at bases  Cardiac: +s1 s2 rrr  GI: soft nt nd +bs  : voids independent  Ext: no edema  Neuro: 4/5 strength LE      LABS:                        7.5    4.7   )-----------( 308      ( 29 Nov 2017 17:16 )             22.0             Albumin: Albumin, Serum: 2.5 g/dL (11-29 @ 06:29)      Allergies    erythromycin (Unknown)  latex (Rash)    Intolerances      MEDICATIONS  (STANDING):  artificial  tears Solution 1 Drop(s) Both EYES two times a day  dexamethasone  Injectable 4 milliGRAM(s) IV Push every 6 hours  enoxaparin Injectable 40 milliGRAM(s) SubCutaneous every 24 hours  HYDROmorphone  Injectable 0.5 milliGRAM(s) IV Push once  influenza   Vaccine 0.5 milliLiter(s) IntraMuscular once  levothyroxine 112 MICROGram(s) Oral daily  metoprolol succinate ER 50 milliGRAM(s) Oral daily  pantoprazole    Tablet 40 milliGRAM(s) Oral two times a day before meals  sertraline 50 milliGRAM(s) Oral daily  sodium chloride 0.9%. 1000 milliLiter(s) (50 mL/Hr) IV Continuous <Continuous>    MEDICATIONS  (PRN):  ALPRAZolam 0.25 milliGRAM(s) Oral two times a day PRN anxiety  oxyCODONE    IR 5 milliGRAM(s) Oral every 6 hours PRN Severe Pain (7 - 10)      RADIOLOGY/ADDITIONAL STUDIES:      EXAM:  BRAIN (MRI) W O CON                        PROCEDURE DATE:  11/29/2017    INTERPRETATION:    MR brain  without gadolinium     IMPRESSION:   Mild periventricular, bifrontal and biparietal subcortical   and deep white matter ischemia. Old lacunar infarction seen in the LEFT   thalamus posteriorly and in the BILATERAL cerebellum.  Retention cysts   are seen in the RIGHT maxillary sinus.  2 cm hypoechoic lesion in the   LEFT parietal bone suspicious for osseous metastases.      SASHA SNEED M.D., ATTENDING RADIOLOGIST  This document has been electronically signed. Nov 30 2017  8:45AM      EXAM:  THORACIC SPINE(MRI)W O CON                        EXAM:  MR SPINE LUMBAR                        PROCEDURE DATE:  11/29/2017      INTERPRETATION:  Exam Date: 11/28/2017       IMPRESSION:      Multilevel multifocal metastases of the thoracic and lumbar spine and   bony pelvis.  Mild inferior endplate height loss at T9, may reflect   degenerative change versus mild compression fracture. Mild superior   endplate height loss at L1, most suggestive of a mild pathologic   compression fracture. No significant spinal canal stenosis of the   thoracic lumbar spine.    Mass lesion in the rightlower hilar region, compatible with patient's   known primary malignancy, is again identified, better evaluated on the   recent CT chest.    Numerous T2 hyperintense lesions within the liver, most suggestive of   hepatic metastases.        KELLEE BARBOSA M.D., ATTENDING RADIOLOGIST  This document has been electronically signed. Nov 29 2017 11:42AM

## 2017-12-01 NOTE — PROGRESS NOTE ADULT - SUBJECTIVE AND OBJECTIVE BOX
This is a 76-year-old female admitted with c/o recurrent fall at home , no back pain patient no any urinary incontinence and stool incontinence and has been able to intermittently walk and bear weight on the left lower extremity.    PMH- vascular dementia, poor historian whereby she was diagnosed with lung cancer NSCLC diagnosed approximately 2 years ago treated with chemoradiation with subsequent progression of disease requiring palliative RT to various areas including left arm due to extensive bony metastasis as well as to lumbar spine most recently approximately 1-2 weeks ago by Dr. Calderón.     : Pt forgetful/confused at bedside and unable to answer basic questions.  When I discussed MRI findings of advanced disease in spine she became very tearful/upset.    was present at bedside.     REVIEW OF SYSTEMS: All other review of systems is negative unless indicated above.    Vital Signs Last 24 Hrs  T(C): 36.9 (2017 14:32), Max: 36.9 (2017 14:32)  T(F): 98.5 (2017 14:32), Max: 98.5 (2017 14:32)  HR: 90 (2017 14:32) (87 - 90)  BP: 132/64 (2017 14:32) (130/64 - 132/64)  BP(mean): --  RR: 17 (2017 14:32) (16 - 17)  SpO2: 97% (2017 14:32) (97% - 97%)    GENERAL: comfortable, confused  HEAD:  Atraumatic, Normocephalic  EYES: EOMI, PERRLA, conjunctiva and sclera clear  HEENT: Moist mucous membranes  NECK: Supple, No JVD  NERVOUS SYSTEM:  Alert & Oriented X3,  CHEST/LUNG: Clear to auscultation bilaterally; No rales, rhonchi, wheezing, or rubs  HEART:S1S2 normal, no murmer  ABDOMEN: Soft, Nontender, Nondistended; Bowel sounds present  GENITOURINARY- Voiding, no palpable bladder  EXTREMITIES:  2+ Peripheral Pulses, No clubbing, cyanosis, or edema  MUSCULOSKELETAL No muscle tenderness, Muscle tone normal, No joint tenderness, no Joint swelling,  SKIN-no rash, no lesion  PSYCH- Mood stable  LYMPH Node- No palpable lymph node    MEDICATIONS  (STANDING):  dexamethasone  Injectable 4 milliGRAM(s) IV Push every 6 hours  enoxaparin Injectable 40 milliGRAM(s) SubCutaneous every 24 hours  HYDROmorphone  Injectable 0.5 milliGRAM(s) IV Push once  influenza   Vaccine 0.5 milliLiter(s) IntraMuscular once  levothyroxine 112 MICROGram(s) Oral daily  metoprolol succinate ER 50 milliGRAM(s) Oral daily  pantoprazole    Tablet 40 milliGRAM(s) Oral two times a day before meals  sertraline 50 milliGRAM(s) Oral daily  sodium chloride 0.9%. 1000 milliLiter(s) (50 mL/Hr) IV Continuous <Continuous>    MEDICATIONS  (PRN):  oxyCODONE    IR 5 milliGRAM(s) Oral every 6 hours PRN Severe Pain (7 - 10)                              7.5    4.7   )-----------( 308      ( 2017 17:16 )             22.0         135  |  100  |  8   ----------------------------<  99  4.0   |  29  |  0.62    Ca    8.8      2017 06:29  Mg     1.5         TPro  6.7  /  Alb  2.5<L>  /  TBili  0.3  /  DBili  x   /  AST  19  /  ALT  13  /  AlkPhos  142<H>      CAPILLARY BLOOD GLUCOSE        LIVER FUNCTIONS - ( 2017 06:29 )  Alb: 2.5 g/dL / Pro: 6.7 gm/dL / ALK PHOS: 142 U/L / ALT: 13 U/L / AST: 19 U/L / GGT: x             Urinalysis Basic - ( 2017 00:01 )    Color: Yellow / Appearance: Clear / S.015 / pH: x  Gluc: x / Ketone: Negative  / Bili: Negative / Urobili: Negative mg/dL   Blood: x / Protein: 15 mg/dL / Nitrite: Negative   Leuk Esterase: Small / RBC: 0-2 /HPF / WBC 6-10   Sq Epi: x / Non Sq Epi: Few / Bacteria: Few        Assessment and Plan:  76 year old admitted for recurrent fall and leg weakness found to have metastatic lung ca to entire spine.    Recurrent fall secondary to metastatic lung ca to entire spine.  -MRI --> mets to entire spine with compression fx of T4, T9, L1.  -neurology evaluation appreciated --> no cord compression  -supportive care with steroids which i will taper  -opioid therapy  -PT eval    #Anemia: Chronic disease  -monitor    #lung cancer with metastatic disease to bone  -f/u oncology concerning management --> opdivo vs home hospice.  -rad-onc --> can be offered a short course of tx following discharge.  Prognosis poor.  -palliative care consult    dvt ppx:  -lovenox    Attending Statement:  40 minutes spent on total encounter; more than 50% of the visit was spent counseling and/or coordinating care by the attending physician. 76-year-old female pmh lung ca with bony mets treated with palliative chemo/RTX with alimta and carboplatin recently switched to opdivo, HTN ,anemia, dementia p/w  with recurrent fall at home , no back pain patient no any urinary incontinence and stool incontinence and has been able to intermittently walk and bear weight on the left lower extremity.        : MRI c/w bony mets to sckull, CT c/w hepatic mets. Pt pain uncontrolled.      REVIEW OF SYSTEMS: All other review of systems is negative unless indicated above.    ICU Vital Signs Last 24 Hrs  T(C): 36.6 (01 Dec 2017 13:58), Max: 36.7 (2017 20:16)  T(F): 97.8 (01 Dec 2017 13:58), Max: 98 (2017 20:16)  HR: 75 (01 Dec 2017 13:58) (71 - 85)  BP: 121/61 (01 Dec 2017 13:58) (121/61 - 133/45)  BP(mean): --  ABP: --  ABP(mean): --  RR: 16 (01 Dec 2017 13:58) (16 - 17)  SpO2: 97% (01 Dec 2017 13:58) (95% - 97%)    GENERAL: comfortable, confused  HEAD:  Atraumatic, Normocephalic  EYES: EOMI, PERRLA, conjunctiva and sclera clear  HEENT: Moist mucous membranes  NECK: Supple, No JVD  NERVOUS SYSTEM:  Alert & Oriented X3,  CHEST/LUNG: Clear to auscultation bilaterally; No rales, rhonchi, wheezing, or rubs  HEART:S1S2 normal, no murmer  ABDOMEN: Soft, Nontender, Nondistended; Bowel sounds present  GENITOURINARY- Voiding, no palpable bladder  EXTREMITIES:  2+ Peripheral Pulses, No clubbing, cyanosis, or edema  MUSCULOSKELETAL No muscle tenderness, Muscle tone normal, No joint tenderness, no Joint swelling,  SKIN-no rash, no lesion  PSYCH- Mood stable  LYMPH Node- No palpable lymph node    MEDICATIONS  (STANDING):  dexamethasone  Injectable 4 milliGRAM(s) IV Push every 6 hours  enoxaparin Injectable 40 milliGRAM(s) SubCutaneous every 24 hours  HYDROmorphone  Injectable 0.5 milliGRAM(s) IV Push once  influenza   Vaccine 0.5 milliLiter(s) IntraMuscular once  levothyroxine 112 MICROGram(s) Oral daily  metoprolol succinate ER 50 milliGRAM(s) Oral daily  pantoprazole    Tablet 40 milliGRAM(s) Oral two times a day before meals  sertraline 50 milliGRAM(s) Oral daily  sodium chloride 0.9%. 1000 milliLiter(s) (50 mL/Hr) IV Continuous <Continuous>    MEDICATIONS  (PRN):  oxyCODONE    IR 5 milliGRAM(s) Oral every 6 hours PRN Severe Pain (7 - 10)                              7.5    4.7   )-----------( 308      ( 2017 17:16 )             22.0         135  |  100  |  8   ----------------------------<  99  4.0   |  29  |  0.62    Ca    8.8      2017 06:29  Mg     1.5         TPro  6.7  /  Alb  2.5<L>  /  TBili  0.3  /  DBili  x   /  AST  19  /  ALT  13  /  AlkPhos  142<H>      CAPILLARY BLOOD GLUCOSE        LIVER FUNCTIONS - ( 2017 06:29 )  Alb: 2.5 g/dL / Pro: 6.7 gm/dL / ALK PHOS: 142 U/L / ALT: 13 U/L / AST: 19 U/L / GGT: x             Urinalysis Basic - ( 2017 00:01 )    Color: Yellow / Appearance: Clear / S.015 / pH: x  Gluc: x / Ketone: Negative  / Bili: Negative / Urobili: Negative mg/dL   Blood: x / Protein: 15 mg/dL / Nitrite: Negative   Leuk Esterase: Small / RBC: 0-2 /HPF / WBC 6-10   Sq Epi: x / Non Sq Epi: Few / Bacteria: Few        Assessment and Plan:  76 year old admitted for recurrent fall and leg weakness found to have metastatic lung ca to entire spine, skull and liver    Recurrent fall secondary to bony mets/primary lung CA  -MRI --> mets to entire spine with compression fx of T4, T9, L1. mets to liver and skull  -Fits criteria for hospice. Per Dr Calderón no further therapy planned. Family meeting today  - pain control  -neurology evaluation appreciated --> no cord compression  -supportive care with steroids  taper  -opioid therapy      #Anemia: Chronic disease  -monitor    #lung cancer with metastatic disease to bone  -f/u oncology concerning management --> as above  -rad-onc --> can be offered a short course of tx following discharge however aiming towards hospice given extent of disease.  Prognosis poor.  -palliative care consult    dvt ppx:  -lovenox    Attending Statement:  45 minutes spent on total encounter; more than 50% of the visit was spent counseling and/or coordinating care by the attending physician.

## 2017-12-01 NOTE — PHYSICAL THERAPY INITIAL EVALUATION ADULT - CRITERIA FOR SKILLED THERAPEUTIC INTERVENTIONS
will hold further PT intervention @ this time as per verbal request from Dr. Turicos 12/1/17 [id number]; will await future orders if PT intervention indicated

## 2017-12-01 NOTE — CONSULT NOTE ADULT - ASSESSMENT
76y old Female coming from home with hx of vascular dementia (mild, poor historian), metastatic lung cancer NSCLC dx 2 years ago treated with chemoradiation (Dr. Warren, Dr. Calderón, on Alimta +carbo, changed to Opdivo 1 week prior to admission), with subsequent progression of disease requiring palliative RT admitted 11/28 for recurrent falls. Found to have MRI showing   numerous osseous metastasis throughout spine and MRI Head showing likely osseous mets to cranium and old ischemia. Palliative Care consulted to assist with establishing GOC.    1) Pain  - 2/2 to widely metastatic bone mets  - agree with oxycodone IR 5mg q4h prn   - will add oxy 10 for breakthrough  - c/w decadron as this can be helpful for bony pain  - aware of rad onc impression and likely that RT will no longer be useful    2) AMS/anxiety  - hx of dementia  - pt unable to keep track of all details, but seems somewhat aware of her prognosis  - agree with addition of xanax 0.25 prn    3) debility  - PpS waning for some time, now<40%  - some evidence of inability to maintain adequate diet due to albumin 2.5 and some muscle wasting  - increasing weakness, needing help with all ADLs here  - PT consult pending    4) Metastatic NSCLC  - widely metastatic to bones and also to liver  - oncology and rad onc notes appreciated  - already on 2nd line palliative therapy  - awaiting Dr. Warren cc; decision further opdivo vs. home hospice  - as per nursing and Dr. Enrique, discussion held with pt and Dr. Calderón with no further palliative RT being offered    5) Prognosis  - poor  - in light of metastatic lung cancer with progression despite chemo, already on second line, and also progressing despite RT, with waning PPS. Pt fits criteria for hospice.     6) GOC/Advanced directives  - pt has questionable capacity for decision-making, but wants to be apart of GOC conversation. seems to be able to demonstrate some understanding of prognosis, though unable to articulate what her disease is  - no HCP on file, thus  Farzad will serve as surrogate ([id number])  - full code  - plan for family meeting at 1pm    Thank you for including us in Mrs. Blum's care. Will continue to follow with you.    Narinder Sen MD  Palliative Care Attending 76y old Female coming from home with hx of vascular dementia (mild, poor historian), metastatic lung cancer NSCLC dx 2 years ago treated with chemoradiation (Dr. Warren, Dr. Calderón, on Alimta +carbo, changed to Opdivo 1 week prior to admission), with subsequent progression of disease requiring palliative RT admitted 11/28 for recurrent falls. Found to have MRI showing   numerous osseous metastasis throughout spine and MRI Head showing likely osseous mets to cranium and old ischemia. Palliative Care consulted to assist with establishing GOC.    1) Pain  - 2/2 to widely metastatic bone mets  - agree with oxycodone IR 5mg q4h prn   - c/w decadron as this can be helpful for bony pain  - aware of rad onc impression and likely that RT will no longer be useful    2) AMS/anxiety  - hx of dementia  - pt unable to keep track of all details, but seems somewhat aware of her prognosis  - agree with addition of xanax 0.25 prn    3) debility  - PpS waning for some time, now<40%  - some evidence of inability to maintain adequate diet due to albumin 2.5 and some muscle wasting  - increasing weakness, needing help with all ADLs here  - PT consult pending    4) Metastatic NSCLC  - widely metastatic to bones and also to liver  - oncology and rad onc notes appreciated  - already on 2nd line palliative therapy  - awaiting Dr. Warren cc; decision further opdivo vs. home hospice  - as per nursing and Dr. Enrique, discussion held with pt and Dr. Calderón with no further palliative RT being offered    5) Prognosis  - poor  - in light of metastatic lung cancer with progression despite chemo, already on second line, and also progressing despite RT, with waning PPS. Pt fits criteria for hospice.     6) GOC/Advanced directives  - pt has questionable capacity for decision-making, but wants to be apart of GOC conversation. seems to be able to demonstrate some understanding of prognosis, though unable to articulate what her disease is  -  Farzad noted as HCP  ([id number]), will bring in form  - full code  - plan for family meeting at 1pm    Thank you for including us in Mrs. Blum's care. Will continue to follow with you.    Narinder Sen MD  Palliative Care Attending

## 2017-12-01 NOTE — PHYSICAL THERAPY INITIAL EVALUATION ADULT - MANUAL MUSCLE TESTING RESULTS, REHAB EVAL
no strength deficits were identified/except L knee extension 2-/5; Dr. Turcios made aware verbally post Eval

## 2017-12-01 NOTE — GOALS OF CARE CONVERSATION - PERSONAL ADVANCE DIRECTIVE - WHAT MATTERS MOST
Comfort, spending time with grandchildren, not burdening her .  was clear that he would do whatever was best for his wife.

## 2017-12-01 NOTE — PHYSICAL THERAPY INITIAL EVALUATION ADULT - MODALITIES TREATMENT COMMENTS
pt left seated in chair post Eval; chair alarm donned; IV intact; callbell in reach; pt instructed not to get up alone; call nursing for assist; bridger well; no c/o pain

## 2017-12-01 NOTE — PHYSICAL THERAPY INITIAL EVALUATION ADULT - LEVEL OF INDEPENDENCE: SIT/STAND, REHAB EVAL
held transfers/gait assessment due to low H&H: 7.5/22.0 & severe L Quad weakness; Dr. Turcios made aware verbally post Eval

## 2017-12-01 NOTE — GOALS OF CARE CONVERSATION - PERSONAL ADVANCE DIRECTIVE - CONVERSATION DETAILS
Met with pt and  to discuss medical issues and GOC. Pt again confirmed wanting to be apart of this meeting, feeling emotionally prepared to discuss everything and wanting all updates so she could plan with her . Her  explained that pt was doing ok at home prior to this admission, though noting more issues with left leg giving out and this resulting in falls. He explained pt's course through chemo and radiation, clarifying that pt initiated care with Dr. Scanlon and care was picked up by Dr. Warren, who they have had pleasant interactions with, but had not had time to sit and have discussion with yet. They were aware that Dr. Warren was on vacation and she was coming back on Monday.  notes he often is not here when doctors come to speak with his wife and due to her dementia she recalls each visit, but not all of the information shared. He was thus grateful to sit with our team and open to hearing everything from medical updates to all possible dispo plans.      and pt were aware of spread of cancer, though pt was unable to name it on previous encounter, she chimed in appropriately recalling that it had spread despite first type of chemo. She was also able to demonstrate understanding that first line chemo and current type was not curative, but rather palliative. They also understood that RT was in question due to extensiveness of spread. They agreed that they wanted to hear from Dr. Warren if further chemo made sense. Pt recalled conversation with Dr. Calderón this am, but only remembered indication that she could likely go home, not about impressions on further RT.  noted leaving message for Dr. Calderón, admitting he is likely missing her because he does not have a cell phone and is here in hospital majority of day. Called Dr. Calderón's office after encounter to let her know  is here so she could speak to him via our phones if possible.     We discussed possible options moving forward including continuing with chemo and going home with home palliative care, vs. no more palliative tx being offered and going home with hospice. They both explained that the thought of hospice was somewhat scary to them, but were open to hearing more information about it. Detailed the philosophy of care and all the benefits and both agreed that this was something they were glad to know was an option. We agreed that pt would likely need aid support at home regardless of dispo plan and that SW/case management could help with this once plan was clearer. We agreed to touch base again about which plan made more sense after they had these conversations with their specialists.     In the interim, we also discussed if they had ever discussed advanced directives. Pt recalled creating a Living Will and recently having it revised. When asked, she said that she did not want to be resuscitated or placed on ventilator, able to explain what this would mean, and the need for putting this in place now sharing story about family member who did not have this "up on the refrigerator" and got CPR although this was not her wish. Pt and  went on to share that their Ch specify that they do not want "heroic measures". We thus, filled out in-and-out-of-hospital DNR forms, and MOLST form to further clarify what heroic measures were. Clarified that DNR did not mean Do not treat, and pt would continue to get current level of care while in hospital, short of these interventions. Pt was able to make choices herself, opting for comfort MOLST and noting that she did not want to have to come back to the hospital but rather focus on her comfort at home. (Decisions are below).    We plan to touch base on Monday, following conversation with Dr. Calderón, Dr. Warren, and their children.

## 2017-12-01 NOTE — GOALS OF CARE CONVERSATION - PERSONAL ADVANCE DIRECTIVE - TREATMENT GUIDELINE COMMENT
MOLST: DNR, comfort measures only (after dc), DNI, do not send to hospital, no feeding tube, trial of fluids, determine use of antibiotics, no dialysis, ok for transfusions (as pt may need one prior to dc), no icu, no pressors. *Spent 60 minutes discussing GOC with family including Advance care planning, explanation and discussion of advance directives, all dispo plans including home palliative and home hospice, reviewed MOLST and finalized DNR/DNI form.

## 2017-12-02 RX ORDER — OXYCODONE AND ACETAMINOPHEN 5; 325 MG/1; MG/1
1 TABLET ORAL EVERY 6 HOURS
Qty: 0 | Refills: 0 | Status: DISCONTINUED | OUTPATIENT
Start: 2017-12-02 | End: 2017-12-04

## 2017-12-02 RX ORDER — DEXAMETHASONE 0.5 MG/5ML
4 ELIXIR ORAL EVERY 8 HOURS
Qty: 0 | Refills: 0 | Status: DISCONTINUED | OUTPATIENT
Start: 2017-12-02 | End: 2017-12-05

## 2017-12-02 RX ADMIN — Medication 0.25 MILLIGRAM(S): at 11:33

## 2017-12-02 RX ADMIN — Medication 112 MICROGRAM(S): at 05:16

## 2017-12-02 RX ADMIN — SERTRALINE 50 MILLIGRAM(S): 25 TABLET, FILM COATED ORAL at 13:03

## 2017-12-02 RX ADMIN — Medication 4 MILLIGRAM(S): at 01:27

## 2017-12-02 RX ADMIN — Medication 4 MILLIGRAM(S): at 21:43

## 2017-12-02 RX ADMIN — Medication 4 MILLIGRAM(S): at 14:30

## 2017-12-02 RX ADMIN — PANTOPRAZOLE SODIUM 40 MILLIGRAM(S): 20 TABLET, DELAYED RELEASE ORAL at 09:37

## 2017-12-02 RX ADMIN — PANTOPRAZOLE SODIUM 40 MILLIGRAM(S): 20 TABLET, DELAYED RELEASE ORAL at 17:05

## 2017-12-02 RX ADMIN — Medication 1 DROP(S): at 05:15

## 2017-12-02 RX ADMIN — Medication 50 MILLIGRAM(S): at 05:16

## 2017-12-02 RX ADMIN — ENOXAPARIN SODIUM 40 MILLIGRAM(S): 100 INJECTION SUBCUTANEOUS at 13:03

## 2017-12-02 RX ADMIN — Medication 1 DROP(S): at 17:04

## 2017-12-02 NOTE — PROGRESS NOTE ADULT - SUBJECTIVE AND OBJECTIVE BOX
76-year-old female pmh lung ca with bony mets treated with palliative chemo/RTX with alimta and carboplatin recently switched to opdivo, HTN ,anemia, dementia p/w  with recurrent fall at home , no back pain patient no any urinary incontinence and stool incontinence and has been able to intermittently walk and bear weight on the left lower extremity.        : MRI c/w bony mets to sckull, CT c/w hepatic mets. Pt pain uncontrolled.   :  at bedside, he is aware of all of the new findings and they both would like to pursue home hospice but are eagerly awaiting Dr Warren's opinion     REVIEW OF SYSTEMS: All other review of systems is negative unless indicated above.    ICU Vital Signs Last 24 Hrs  T(C): 36.7 (02 Dec 2017 14:18), Max: 36.8 (01 Dec 2017 21:02)  T(F): 98 (02 Dec 2017 14:18), Max: 98.2 (01 Dec 2017 21:02)  HR: 81 (02 Dec 2017 14:18) (71 - 87)  BP: 137/53 (02 Dec 2017 14:18) (133/60 - 147/69)  BP(mean): --  ABP: --  ABP(mean): --  RR: 16 (02 Dec 2017 14:18) (16 - 16)  SpO2: 97% (02 Dec 2017 14:18) (97% - 97%)      GENERAL: comfortable, confused  HEAD:  Atraumatic, Normocephalic  EYES: EOMI, PERRLA, conjunctiva and sclera clear  HEENT: Moist mucous membranes  NECK: Supple, No JVD  NERVOUS SYSTEM:  Alert & Oriented X3,  CHEST/LUNG: Clear to auscultation bilaterally; No rales, rhonchi, wheezing, or rubs  HEART:S1S2 normal, no murmer  ABDOMEN: Soft, Nontender, Nondistended; Bowel sounds present  GENITOURINARY- Voiding, no palpable bladder  EXTREMITIES:  2+ Peripheral Pulses, No clubbing, cyanosis, or edema  MUSCULOSKELETAL No muscle tenderness, Muscle tone normal, No joint tenderness, no Joint swelling,  SKIN-no rash, no lesion  PSYCH- Mood stable  LYMPH Node- No palpable lymph node    MEDICATIONS  (STANDING):  dexamethasone  Injectable 4 milliGRAM(s) IV Push every 6 hours  enoxaparin Injectable 40 milliGRAM(s) SubCutaneous every 24 hours  HYDROmorphone  Injectable 0.5 milliGRAM(s) IV Push once  influenza   Vaccine 0.5 milliLiter(s) IntraMuscular once  levothyroxine 112 MICROGram(s) Oral daily  metoprolol succinate ER 50 milliGRAM(s) Oral daily  pantoprazole    Tablet 40 milliGRAM(s) Oral two times a day before meals  sertraline 50 milliGRAM(s) Oral daily  sodium chloride 0.9%. 1000 milliLiter(s) (50 mL/Hr) IV Continuous <Continuous>    MEDICATIONS  (PRN):  oxyCODONE    IR 5 milliGRAM(s) Oral every 6 hours PRN Severe Pain (7 - 10)                              7.5    4.7   )-----------( 308      ( 2017 17:16 )             22.0         135  |  100  |  8   ----------------------------<  99  4.0   |  29  |  0.62    Ca    8.8      2017 06:29  Mg     1.5         TPro  6.7  /  Alb  2.5<L>  /  TBili  0.3  /  DBili  x   /  AST  19  /  ALT  13  /  AlkPhos  142<H>      CAPILLARY BLOOD GLUCOSE        LIVER FUNCTIONS - ( 2017 06:29 )  Alb: 2.5 g/dL / Pro: 6.7 gm/dL / ALK PHOS: 142 U/L / ALT: 13 U/L / AST: 19 U/L / GGT: x             Urinalysis Basic - ( 2017 00:01 )    Color: Yellow / Appearance: Clear / S.015 / pH: x  Gluc: x / Ketone: Negative  / Bili: Negative / Urobili: Negative mg/dL   Blood: x / Protein: 15 mg/dL / Nitrite: Negative   Leuk Esterase: Small / RBC: 0-2 /HPF / WBC 6-10   Sq Epi: x / Non Sq Epi: Few / Bacteria: Few        Assessment and Plan:  76 year old admitted for recurrent fall and leg weakness found to have metastatic lung ca to entire spine, skull and liver    Recurrent fall secondary to bony mets/primary lung CA  -MRI --> mets to entire spine with compression fx of T4, T9, L1. mets to liver and skull  -Fits criteria for hospice. Per Dr Calderón no further therapy planned. -  - Would like to pursue home hospice. Will touch base with Dr Warren  - pain control- still not controlled with oxy IR 5mg q6prn, will add percocet 5/325 q6 prn for breakthrough pain  -neurology evaluation appreciated --> no cord compression  -supportive care with steroids  taper  -opioid therapy      #Anemia: Chronic disease  -monitor    #lung cancer with metastatic disease to bone  -f/u oncology concerning management --> as above  -rad-onc --> can be offered a short course of tx following discharge however aiming towards hospice given extent of disease.  Prognosis poor.  -palliative care consult    dvt ppx:  -lovenox    Attending Statement:  45 minutes spent on total encounter; more than 50% of the visit was spent counseling and/or coordinating care by the attending physician.

## 2017-12-02 NOTE — PROGRESS NOTE ADULT - ASSESSMENT
76y old Female coming from home with hx of vascular dementia (mild, poor historian), metastatic lung cancer NSCLC dx 2 years ago treated with chemoradiation (Dr. Warren, Dr. Calderón, on Alimta +carbo, changed to Opdivo 1 week prior to admission), with subsequent progression of disease requiring palliative RT admitted 11/28 for recurrent falls. Found to have MRI showing   numerous osseous metastasis throughout spine and MRI Head showing likely osseous mets to cranium and old ischemia. Palliative Care consulted to assist with establishing GOC.    1) Pain  - 2/2 to widely metastatic bone mets  - cont  with oxycodone IR 5mg q4h prn   - c/w decadron as this can be helpful for bony pain  - aware of rad onc impression and likely that RT will no longer be useful    2) AMS/anxiety  - hx of dementia  - pt unable to keep track of all details, but seems somewhat aware of her prognosis  - cont xanax 0.25 prn  - received X 1 over the past 24 hrs    3) debility  - PpS waning for some time, now<40%  - some evidence of inability to maintain adequate diet due to albumin 2.5 and some muscle wasting  - increasing weakness, needing help with all ADLs here  - PT consult pending    4) Metastatic NSCLC  - widely metastatic to bones and also to liver  - oncology and rad onc notes appreciated  - already on 2nd line palliative therapy  - awaiting Dr. Warren cc; decision further opdivo vs. home hospice  - as per nursing and Dr. Enrique, discussion held with pt and Dr. Calderón with no further palliative RT being offered    5) Prognosis  - poor  - in light of metastatic lung cancer with progression despite chemo, already on second line, and also progressing despite RT, with waning PPS. Pt fits criteria for hospice.     6) GOC/Advanced directives  - pt has questionable capacity for decision-making  -  Farzad noted as HCP  ([id number]), will bring in form  - full code

## 2017-12-03 LAB
HCT VFR BLD CALC: 24.8 % — LOW (ref 34.5–45)
HGB BLD-MCNC: 8.5 G/DL — LOW (ref 11.5–15.5)
MCHC RBC-ENTMCNC: 33 PG — SIGNIFICANT CHANGE UP (ref 27–34)
MCHC RBC-ENTMCNC: 34.2 GM/DL — SIGNIFICANT CHANGE UP (ref 32–36)
MCV RBC AUTO: 96.6 FL — SIGNIFICANT CHANGE UP (ref 80–100)
PLATELET # BLD AUTO: 276 K/UL — SIGNIFICANT CHANGE UP (ref 150–400)
RBC # BLD: 2.56 M/UL — LOW (ref 3.8–5.2)
RBC # FLD: 14.8 % — HIGH (ref 10.3–14.5)
WBC # BLD: 10.2 K/UL — SIGNIFICANT CHANGE UP (ref 3.8–10.5)
WBC # FLD AUTO: 10.2 K/UL — SIGNIFICANT CHANGE UP (ref 3.8–10.5)

## 2017-12-03 RX ADMIN — SERTRALINE 50 MILLIGRAM(S): 25 TABLET, FILM COATED ORAL at 11:51

## 2017-12-03 RX ADMIN — Medication 4 MILLIGRAM(S): at 21:49

## 2017-12-03 RX ADMIN — OXYCODONE AND ACETAMINOPHEN 1 TABLET(S): 5; 325 TABLET ORAL at 01:23

## 2017-12-03 RX ADMIN — Medication 0.25 MILLIGRAM(S): at 21:58

## 2017-12-03 RX ADMIN — ENOXAPARIN SODIUM 40 MILLIGRAM(S): 100 INJECTION SUBCUTANEOUS at 11:50

## 2017-12-03 RX ADMIN — Medication 4 MILLIGRAM(S): at 14:35

## 2017-12-03 RX ADMIN — PANTOPRAZOLE SODIUM 40 MILLIGRAM(S): 20 TABLET, DELAYED RELEASE ORAL at 17:08

## 2017-12-03 RX ADMIN — OXYCODONE AND ACETAMINOPHEN 1 TABLET(S): 5; 325 TABLET ORAL at 11:51

## 2017-12-03 RX ADMIN — Medication 4 MILLIGRAM(S): at 05:24

## 2017-12-03 RX ADMIN — Medication 1 DROP(S): at 05:24

## 2017-12-03 RX ADMIN — Medication 50 MILLIGRAM(S): at 05:24

## 2017-12-03 RX ADMIN — PANTOPRAZOLE SODIUM 40 MILLIGRAM(S): 20 TABLET, DELAYED RELEASE ORAL at 08:56

## 2017-12-03 RX ADMIN — Medication 112 MICROGRAM(S): at 05:24

## 2017-12-03 RX ADMIN — Medication 1 DROP(S): at 18:35

## 2017-12-03 NOTE — PROGRESS NOTE ADULT - SUBJECTIVE AND OBJECTIVE BOX
76-year-old female pmh lung ca with bony mets treated with palliative chemo/RTX with alimta and carboplatin recently switched to opdivo, HTN ,anemia, dementia p/w  with recurrent fall at home , no back pain patient no any urinary incontinence and stool incontinence and has been able to intermittently walk and bear weight on the left lower extremity.        : MRI c/w bony mets to skull, CT c/w hepatic mets. Pt pain uncontrolled.   :  at bedside, he is aware of all of the new findings and they both would like to pursue home hospice but are eagerly awaiting Dr Warren's opinion  12/3: pain well controlled today with modified pain schedule     REVIEW OF SYSTEMS: All other review of systems is negative unless indicated above.    ICU Vital Signs Last 24 Hrs  T(C): 36.4 (03 Dec 2017 14:39), Max: 37.3 (02 Dec 2017 22:09)  T(F): 97.5 (03 Dec 2017 14:39), Max: 99.1 (02 Dec 2017 22:09)  HR: 83 (03 Dec 2017 14:39) (76 - 93)  BP: 130/65 (03 Dec 2017 14:39) (130/65 - 178/66)  BP(mean): --  ABP: --  ABP(mean): --  RR: 16 (03 Dec 2017 14:39) (16 - 16)  SpO2: 98% (03 Dec 2017 14:39) (96% - 98%)        GENERAL: comfortable, confused  HEAD:  Atraumatic, Normocephalic  EYES: EOMI, PERRLA, conjunctiva and sclera clear  HEENT: Moist mucous membranes  NECK: Supple, No JVD  NERVOUS SYSTEM:  Alert & Oriented X3,  CHEST/LUNG: Clear to auscultation bilaterally; No rales, rhonchi, wheezing, or rubs  HEART:S1S2 normal, no murmer  ABDOMEN: Soft, Nontender, Nondistended; Bowel sounds present  GENITOURINARY- Voiding, no palpable bladder  EXTREMITIES:  2+ Peripheral Pulses, No clubbing, cyanosis, or edema  MUSCULOSKELETAL No muscle tenderness, Muscle tone normal, No joint tenderness, no Joint swelling,  SKIN-no rash, no lesion  PSYCH- Mood stable  LYMPH Node- No palpable lymph node    MEDICATIONS  (STANDING):  dexamethasone  Injectable 4 milliGRAM(s) IV Push every 6 hours  enoxaparin Injectable 40 milliGRAM(s) SubCutaneous every 24 hours  HYDROmorphone  Injectable 0.5 milliGRAM(s) IV Push once  influenza   Vaccine 0.5 milliLiter(s) IntraMuscular once  levothyroxine 112 MICROGram(s) Oral daily  metoprolol succinate ER 50 milliGRAM(s) Oral daily  pantoprazole    Tablet 40 milliGRAM(s) Oral two times a day before meals  sertraline 50 milliGRAM(s) Oral daily  sodium chloride 0.9%. 1000 milliLiter(s) (50 mL/Hr) IV Continuous <Continuous>    MEDICATIONS  (PRN):  oxyCODONE    IR 5 milliGRAM(s) Oral every 6 hours PRN Severe Pain (7 - 10)                              7.5    4.7   )-----------( 308      ( 2017 17:16 )             22.0         135  |  100  |  8   ----------------------------<  99  4.0   |  29  |  0.62    Ca    8.8      2017 06:29  Mg     1.5         TPro  6.7  /  Alb  2.5<L>  /  TBili  0.3  /  DBili  x   /  AST  19  /  ALT  13  /  AlkPhos  142<H>      CAPILLARY BLOOD GLUCOSE        LIVER FUNCTIONS - ( 2017 06:29 )  Alb: 2.5 g/dL / Pro: 6.7 gm/dL / ALK PHOS: 142 U/L / ALT: 13 U/L / AST: 19 U/L / GGT: x             Urinalysis Basic - ( 2017 00:01 )    Color: Yellow / Appearance: Clear / S.015 / pH: x  Gluc: x / Ketone: Negative  / Bili: Negative / Urobili: Negative mg/dL   Blood: x / Protein: 15 mg/dL / Nitrite: Negative   Leuk Esterase: Small / RBC: 0-2 /HPF / WBC 6-10   Sq Epi: x / Non Sq Epi: Few / Bacteria: Few        Assessment and Plan:  76 year old admitted for recurrent fall and leg weakness found to have metastatic lung ca to entire spine, skull and liver    Recurrent fall secondary to bony mets/primary lung CA  -MRI --> mets to entire spine with compression fx of T4, T9, L1. mets to liver and skull  -Fits criteria for hospice. Per Dr Calderón no further therapy planned. -  - Would like to pursue home hospice. Will touch base with Dr Warren  - pain control- still not controlled with oxy IR 5mg q6prn, will add percocet 5/325 q6 prn for breakthrough pain  -neurology evaluation appreciated --> no cord compression  -supportive care with steroids  taper  -opioid therapy      #Anemia: Chronic disease  -monitor    #lung cancer with metastatic disease to bone  -f/u oncology concerning management --> as above  -rad-onc --> can be offered a short course of tx following discharge however aiming towards hospice given extent of disease.  Prognosis poor.  -palliative care consult    dvt ppx:  -lovenox    Attending Statement:  35 minutes spent on total encounter; more than 50% of the visit was spent counseling and/or coordinating care by the attending physician.

## 2017-12-04 ENCOUNTER — TRANSCRIPTION ENCOUNTER (OUTPATIENT)
Age: 77
End: 2017-12-04

## 2017-12-04 RX ORDER — ALPRAZOLAM 0.25 MG
1 TABLET ORAL
Qty: 30 | Refills: 0 | OUTPATIENT
Start: 2017-12-04

## 2017-12-04 RX ORDER — OXYCODONE HYDROCHLORIDE 5 MG/1
1 TABLET ORAL
Qty: 0 | Refills: 0 | COMMUNITY

## 2017-12-04 RX ORDER — PANTOPRAZOLE SODIUM 20 MG/1
1 TABLET, DELAYED RELEASE ORAL
Qty: 30 | Refills: 0 | OUTPATIENT
Start: 2017-12-04

## 2017-12-04 RX ORDER — DEXAMETHASONE 0.5 MG/5ML
1 ELIXIR ORAL
Qty: 9 | Refills: 0 | OUTPATIENT
Start: 2017-12-04

## 2017-12-04 RX ADMIN — Medication 1 DROP(S): at 17:14

## 2017-12-04 RX ADMIN — OXYCODONE HYDROCHLORIDE 5 MILLIGRAM(S): 5 TABLET ORAL at 10:38

## 2017-12-04 RX ADMIN — ENOXAPARIN SODIUM 40 MILLIGRAM(S): 100 INJECTION SUBCUTANEOUS at 12:21

## 2017-12-04 RX ADMIN — OXYCODONE HYDROCHLORIDE 5 MILLIGRAM(S): 5 TABLET ORAL at 06:40

## 2017-12-04 RX ADMIN — PANTOPRAZOLE SODIUM 40 MILLIGRAM(S): 20 TABLET, DELAYED RELEASE ORAL at 08:57

## 2017-12-04 RX ADMIN — Medication 112 MICROGRAM(S): at 05:52

## 2017-12-04 RX ADMIN — SERTRALINE 50 MILLIGRAM(S): 25 TABLET, FILM COATED ORAL at 12:21

## 2017-12-04 RX ADMIN — Medication 50 MILLIGRAM(S): at 05:52

## 2017-12-04 RX ADMIN — OXYCODONE HYDROCHLORIDE 5 MILLIGRAM(S): 5 TABLET ORAL at 06:15

## 2017-12-04 RX ADMIN — Medication 1 DROP(S): at 05:53

## 2017-12-04 RX ADMIN — Medication 4 MILLIGRAM(S): at 13:54

## 2017-12-04 RX ADMIN — OXYCODONE HYDROCHLORIDE 5 MILLIGRAM(S): 5 TABLET ORAL at 11:05

## 2017-12-04 RX ADMIN — Medication 4 MILLIGRAM(S): at 22:08

## 2017-12-04 RX ADMIN — Medication 4 MILLIGRAM(S): at 05:52

## 2017-12-04 RX ADMIN — OXYCODONE HYDROCHLORIDE 5 MILLIGRAM(S): 5 TABLET ORAL at 17:12

## 2017-12-04 RX ADMIN — PANTOPRAZOLE SODIUM 40 MILLIGRAM(S): 20 TABLET, DELAYED RELEASE ORAL at 17:12

## 2017-12-04 NOTE — DISCHARGE NOTE ADULT - CARE PLAN
Principal Discharge DX:	Weakness of both lower extremities  Goal:	lung ca with diffuse osseous mets. Plan for home hospice  Instructions for follow-up, activity and diet:	pain control.

## 2017-12-04 NOTE — PROGRESS NOTE ADULT - ASSESSMENT
76y old Female coming from home with hx of vascular dementia (mild, poor historian), metastatic lung cancer NSCLC dx 2 years ago treated with chemoradiation (Dr. Warren, Dr. Calderón, on Alimta +carbo, changed to Opdivo 1 week prior to admission), with subsequent progression of disease requiring palliative RT admitted 11/28 for recurrent falls. Found to have MRI showing   numerous osseous metastasis throughout spine and MRI Head showing likely osseous mets to cranium and old ischemia. Palliative Care consulted to assist with establishing GOC.    1) Pain  - 2/2 to widely metastatic bone mets  - agree with oxycodone IR 5mg q4h prn (will dc percocet, team unaware that pain over weekend was due to not receiving this, as opposed to having pain despite this.   - c/w decadron as this can be helpful for bony pain  - aware of rad onc impression and likely that RT will no longer be useful    2) AMS/anxiety  - hx of dementia  - pt unable to keep track of all details, but seems somewhat aware of her prognosis  - agree with addition of xanax 0.25 prn    3) debility  - PpS waning for some time, now<40%  - some evidence of inability to maintain adequate diet due to albumin 2.5 and some muscle wasting  - increasing weakness, needing help with all ADLs here  - PT consult pending    4) Metastatic NSCLC  - widely metastatic to bones and also to liver  - oncology and rad onc notes appreciated  - already on 2nd line palliative therapy  - Dr. Warren to follow up, but family already deciding on home hospice  - as per nursing and Dr. Turcios, discussion held with pt and Dr. Calderón with no further palliative RT being offered    5) Prognosis  - poor  - in light of metastatic lung cancer with progression despite chemo, already on second line, and also progressing despite RT, with waning PPS. Pt fits criteria for hospice.     6) GOC/Advanced directives  - pt seems to have capacity for decision-making, as she is able to demonstrate understanding of prognosis, and manipulate information to make big decisions with help of her  about her care, with them coming to decisions on advanced directives together  -  Farzad noted as HCP  ([id number]/ [id number]), will bring in form  - DNR and DNI, comfort MOLST on chart  - GOC meeting held 12/1, readdressed today- family now wanting home hospice with VNS, referral being sent by SW, hoping for dc as soon as equipment in place.    Thank you for including us in Mrs. Blum's care. Will continue to follow with you.    Narinder Sen MD  Palliative Care Attending

## 2017-12-04 NOTE — DISCHARGE NOTE ADULT - MEDICATION SUMMARY - MEDICATIONS TO STOP TAKING
I will STOP taking the medications listed below when I get home from the hospital:    oxyCODONE 5 mg oral tablet  -- 1 tab(s) by mouth every 6 hours

## 2017-12-04 NOTE — CONSULT NOTE ADULT - SUBJECTIVE AND OBJECTIVE BOX
Hematology/Oncology Consult Note  [facility] Department of Hematology and Medical Oncology    DONOVAN FLOYD  MRN-[id number]        HPI:     This is a 76-year-old female with vascular dementia, poor historian whereby she was diagnosed with lung cancer NSCLC diagnosed approximately 2 years ago treated with chemoradiation with subsequent progression of disease requiring palliative RT to various areas including left arm due to extensive bony metastasis as well as to lumbar spine most recently approximately 1-2 weeks ago by Dr. Calderón.      The patient received palliative systemic chemotherapy with Alimta and carboplatin earlier this year and recently changed to Opdivo - s/p first infusion approx 2 weeks ago  Presented to ER with c/o recurrent falls.    Since admission patient has had imaging: MRI of the thoracic spine in which significant for numerous osseous metastasis involving the thoracic T4-T5 with no pathological vertebral body compression or neoplastic encroachment into the spinal the 2.8 mm posterior protrusion contributing to slight central canal stenosis at T7-T8 with no cord compression yet the report suggested cord impression. The patient was unable to tolerate The MRI C spine and MRI brain due to pain and the study was interupted.   The patient  had previously cervical spine fusion C5 several years ago .  She has been treated with palliative xrt to multiple sites (not t spine).  She was treated to Lspine,l left biceps, sternum and left base of skull.      Allergies    erythromycin (Unknown)  latex (Rash)    Intolerances        FAMILY HISTORY:  No pertinent family history in first degree relatives      PAST MEDICAL & SURGICAL HISTORY:  Vitamin B12 deficiency  Intervertebral disc disorder: S/P cervical fusion  Osteoporosis  Lung cancer  Macular degeneration: left eye  Hypothyroidism, unspecified type  HTN (hypertension)  History of facial surgery: right cheekbone surgery - 06/2017  History of lung surgery: right lung nodules removed 2015  H/O umbilical hernia repair  S/P breast lumpectomy  H/O spinal fusion: anterior cervical fusion 2x   15 yrs ago, 2014  H/O oophorectomy: Right side      Social History:   reviewed     Drug Dosing Weight  Height (cm): 162.56 (28 Nov 2017 15:59)  Weight (kg): 70.3 (28 Nov 2017 15:59)  BMI (kg/m2): 26.6 (28 Nov 2017 15:59)  BSA (m2): 1.76 (28 Nov 2017 15:59)    MEDICATIONS  (STANDING):  artificial  tears Solution 1 Drop(s) Both EYES two times a day  dexamethasone     Tablet 4 milliGRAM(s) Oral every 8 hours  enoxaparin Injectable 40 milliGRAM(s) SubCutaneous every 24 hours  HYDROmorphone  Injectable 0.5 milliGRAM(s) IV Push once  influenza   Vaccine 0.5 milliLiter(s) IntraMuscular once  levothyroxine 112 MICROGram(s) Oral daily  metoprolol succinate ER 50 milliGRAM(s) Oral daily  pantoprazole    Tablet 40 milliGRAM(s) Oral two times a day before meals  sertraline 50 milliGRAM(s) Oral daily  sodium chloride 0.9%. 1000 milliLiter(s) (50 mL/Hr) IV Continuous <Continuous>    MEDICATIONS  (PRN):  ALPRAZolam 0.25 milliGRAM(s) Oral two times a day PRN anxiety  oxyCODONE    5 mG/acetaminophen 325 mG 1 Tablet(s) Oral every 6 hours PRN Moderate Pain (4 - 6)  oxyCODONE    IR 5 milliGRAM(s) Oral every 4 hours PRN Severe Pain (7 - 10)      REVIEW OF SYSTEMS:  pain in back, leg weakness, otherwise negative "at the moment" per patient    Vital Signs Last 24 Hrs  T(C): 36.6 (04 Dec 2017 12:12), Max: 36.7 (04 Dec 2017 05:29)  T(F): 97.8 (04 Dec 2017 12:12), Max: 98 (04 Dec 2017 05:29)  HR: 79 (04 Dec 2017 12:12) (68 - 90)  BP: 125/57 (04 Dec 2017 12:12) (125/57 - 167/70)  BP(mean): --  RR: 17 (04 Dec 2017 12:12) (16 - 17)  SpO2: 98% (04 Dec 2017 12:12) (97% - 98%)    I&O's Summary    03 Dec 2017 07:01  -  04 Dec 2017 07:00  --------------------------------------------------------  IN: 240 mL / OUT: 600 mL / NET: -360 mL        PHYSICAL EXAM:  General: NAD lying in bed, frail appearing  HEENT:EOMI  Cardiac:RRR  Respiratory:CTA ant exam only  Abdominal:soft    CBC Full  -  ( 03 Dec 2017 06:14 )  WBC Count : 10.2 K/uL  Hemoglobin : 8.5 g/dL  Hematocrit : 24.8 %  Platelet Count - Automated : 276 K/uL  Mean Cell Volume : 96.6 fl  Mean Cell Hemoglobin : 33.0 pg  Mean Cell Hemoglobin Concentration : 34.2 gm/dL  Auto Neutrophil # : x  Auto Lymphocyte # : x  Auto Monocyte # : x  Auto Eosinophil # : x  Auto Basophil # : x  Auto Neutrophil % : x  Auto Lymphocyte % : x  Auto Monocyte % : x  Auto Eosinophil % : x  Auto Basophil % : x              RADIOLOGY:

## 2017-12-04 NOTE — PROGRESS NOTE ADULT - SUBJECTIVE AND OBJECTIVE BOX
76-year-old female pmh lung ca with bony mets treated with palliative chemo/RTX with alimta and carboplatin recently switched to opdivo, HTN ,anemia, dementia p/w  with recurrent fall at home , no back pain patient no any urinary incontinence and stool incontinence and has been able to intermittently walk and bear weight on the left lower extremity.        : MRI c/w bony mets to skull, CT c/w hepatic mets. Pt pain uncontrolled.   :  at bedside, he is aware of all of the new findings and they both would like to pursue home hospice but are eagerly awaiting Dr Warren's opinion  12/3: pain well controlled today with modified pain schedule  : pain controlled     REVIEW OF SYSTEMS: All other review of systems is negative unless indicated above.    ICU Vital Signs Last 24 Hrs  T(C): 36.7 (04 Dec 2017 05:29), Max: 36.7 (04 Dec 2017 05:29)  T(F): 98 (04 Dec 2017 05:29), Max: 98 (04 Dec 2017 05:29)  HR: 68 (04 Dec 2017 05:29) (68 - 90)  BP: 167/70 (04 Dec 2017 05:29) (129/62 - 167/70)  BP(mean): --  ABP: --  ABP(mean): --  RR: 16 (04 Dec 2017 05:29) (16 - 16)  SpO2: 97% (04 Dec 2017 05:29) (97% - 98%)          GENERAL: comfortable, confused  HEAD:  Atraumatic, Normocephalic  EYES: EOMI, PERRLA, conjunctiva and sclera clear  HEENT: Moist mucous membranes  NECK: Supple, No JVD  NERVOUS SYSTEM:  Alert & Oriented X3,  CHEST/LUNG: Clear to auscultation bilaterally; No rales, rhonchi, wheezing, or rubs  HEART:S1S2 normal, no murmur  ABDOMEN: Soft, Nontender, Nondistended; Bowel sounds present  GENITOURINARY- Voiding, no palpable bladder  EXTREMITIES:  2+ Peripheral Pulses, No clubbing, cyanosis, or edema  MUSCULOSKELETAL No muscle tenderness, Muscle tone normal, No joint tenderness, no Joint swelling,  SKIN-no rash, no lesion  PSYCH- Mood stable  LYMPH Node- No palpable lymph node    MEDICATIONS  (STANDING):  dexamethasone  Injectable 4 milliGRAM(s) IV Push every 6 hours  enoxaparin Injectable 40 milliGRAM(s) SubCutaneous every 24 hours  HYDROmorphone  Injectable 0.5 milliGRAM(s) IV Push once  influenza   Vaccine 0.5 milliLiter(s) IntraMuscular once  levothyroxine 112 MICROGram(s) Oral daily  metoprolol succinate ER 50 milliGRAM(s) Oral daily  pantoprazole    Tablet 40 milliGRAM(s) Oral two times a day before meals  sertraline 50 milliGRAM(s) Oral daily  sodium chloride 0.9%. 1000 milliLiter(s) (50 mL/Hr) IV Continuous <Continuous>    MEDICATIONS  (PRN):  oxyCODONE    IR 5 milliGRAM(s) Oral every 6 hours PRN Severe Pain (7 - 10)                              7.5    4.7   )-----------( 308      ( 2017 17:16 )             22.0         135  |  100  |  8   ----------------------------<  99  4.0   |  29  |  0.62    Ca    8.8      2017 06:29  Mg     1.5         TPro  6.7  /  Alb  2.5<L>  /  TBili  0.3  /  DBili  x   /  AST  19  /  ALT  13  /  AlkPhos  142<H>      CAPILLARY BLOOD GLUCOSE        LIVER FUNCTIONS - ( 2017 06:29 )  Alb: 2.5 g/dL / Pro: 6.7 gm/dL / ALK PHOS: 142 U/L / ALT: 13 U/L / AST: 19 U/L / GGT: x             Urinalysis Basic - ( 2017 00:01 )    Color: Yellow / Appearance: Clear / S.015 / pH: x  Gluc: x / Ketone: Negative  / Bili: Negative / Urobili: Negative mg/dL   Blood: x / Protein: 15 mg/dL / Nitrite: Negative   Leuk Esterase: Small / RBC: 0-2 /HPF / WBC 6-10   Sq Epi: x / Non Sq Epi: Few / Bacteria: Few        Assessment and Plan:  76 year old admitted for recurrent fall and leg weakness found to have metastatic lung ca to entire spine, skull and liver    Recurrent fall secondary to bony mets/primary lung CA  - MRI --> mets to entire spine with compression fx of T4, T9, L1. Ossesous mets to liver and skull  - Fits criteria for hospice. Per Dr Calderón no further therapy planned. -  - Plan for home hospice. Case d/w Dr Warren   - pain control-  oxy IR 5mg q6prn  - neurology evaluation appreciated --> no cord compression  - supportive care with steroids  taper  - opioid therapy      #Anemia: Chronic disease  -monitor    #lung cancer with metastatic disease to bone  -f/u oncology concerning management --> as above  -rad-onc --> no further tx offered at this time.  Prognosis poor.      dvt ppx:  -lovenox    Attending Statement:  35 minutes spent on total encounter; more than 50% of the visit was spent counseling and/or coordinating care by the attending physician.

## 2017-12-04 NOTE — DISCHARGE NOTE ADULT - HOSPITAL COURSE
76-year-old female pmh lung ca with bony mets treated with palliative chemo/RTX with alimta and carboplatin recently switched to opdivo, HTN ,anemia, dementia p/w  with recurrent fall at home , no back pain patient no any urinary incontinence and stool incontinence and has been able to intermittently walk and bear weight on the left lower extremity.Assessment and Plan:  76 year old admitted for recurrent fall and leg weakness found to have metastatic lung ca to entire spine, skull and liver    Recurrent fall secondary to bony mets/primary lung CA  - MRI --> mets to entire spine with compression fx of T4, T9, L1. Ossesous mets to liver and skull  - Fits criteria for hospice. Per Dr Calderón no further therapy planned. -  - Plan for home hospice. Case d/w Dr Warren   - pain control-  oxy IR 5mg q6prn  - neurology evaluation appreciated --> no cord compression  - supportive care with steroids  taper  - opioid therapy      #Anemia: Chronic disease  -monitor    #lung cancer with metastatic disease to bone  -f/u oncology concerning management --> as above  -rad-onc --> no further tx offered at this time.  Prognosis poor.      total dc time > 35 min  PCP notified, left message awaiting call back

## 2017-12-04 NOTE — CONSULT NOTE ADULT - ASSESSMENT
75 y/o woman with metastatic non small cell lung cancer s/o palliative chemotherapy with Alimta and carboplatin and recently started on  second line therapy with Opdivo , s/p palliative radiation to  multiple areas of bone mets. Overall declining performance status. Admitted s/p fall at home- due to pain/?  radicular sx / thoracic spine involvement.  Patient has been admitted x several days and has been seen by palliative care  After multiple discussions patient and  elect for home hospice  Considering her overall performance status, aggressive disease, short interval to progression, this is an appropriate course of action  Thank you to all those involved in her care

## 2017-12-04 NOTE — DISCHARGE NOTE ADULT - MEDICATION SUMMARY - MEDICATIONS TO TAKE
I will START or STAY ON the medications listed below when I get home from the hospital:    dexamethasone 4 mg oral tablet  -- 1 tab(s) by mouth 2 times a day for 3 days   then 1 tab daily for 3 days  -- Indication: For bony mets    oxyCODONE-acetaminophen 5 mg-325 mg oral tablet  -- 1 tab(s) by mouth every 6 hours, As needed, Moderate Pain (4 - 6) MDD:4  -- Indication: For Pain    sertraline 50 mg oral tablet  -- 1 tab(s) by mouth once a day  -- Indication: For depression    losartan-hydrochlorothiazide 50mg-12.5mg oral tablet  -- 1 tab(s) by mouth once a day in the afternoon  -- Indication: For htn    ALPRAZolam 0.25 mg oral tablet  -- 1 tab(s) by mouth 2 times a day, As needed, anxiety MDD:2  -- Indication: For anxiety    Metoprolol Succinate ER 50 mg oral tablet, extended release  -- 1 tab(s) by mouth once a day (at bedtime)  -- Indication: For htn    pantoprazole 40 mg oral delayed release tablet  -- 1 tab(s) by mouth 2 times a day (before meals)  -- Indication: For for your stomach    Synthroid 112 mcg (0.112 mg) oral tablet  -- 1 tab(s) by mouth once a day  -- Indication: For hypothyroidism    Vitamin B-12  --  injectable once a month  -- Indication: For supplement

## 2017-12-04 NOTE — DISCHARGE NOTE ADULT - PATIENT PORTAL LINK FT
“You can access the [employer] Patient Portal, offered by [facility], by registering with the following website: [email or web address]”

## 2017-12-05 VITALS
HEART RATE: 84 BPM | DIASTOLIC BLOOD PRESSURE: 63 MMHG | RESPIRATION RATE: 17 BRPM | SYSTOLIC BLOOD PRESSURE: 132 MMHG | TEMPERATURE: 98 F | OXYGEN SATURATION: 99 %

## 2017-12-05 RX ORDER — DEXAMETHASONE 0.5 MG/5ML
4 ELIXIR ORAL
Qty: 0 | Refills: 0 | Status: DISCONTINUED | OUTPATIENT
Start: 2017-12-05 | End: 2017-12-05

## 2017-12-05 RX ADMIN — PANTOPRAZOLE SODIUM 40 MILLIGRAM(S): 20 TABLET, DELAYED RELEASE ORAL at 16:24

## 2017-12-05 RX ADMIN — Medication 112 MICROGRAM(S): at 05:57

## 2017-12-05 RX ADMIN — OXYCODONE HYDROCHLORIDE 5 MILLIGRAM(S): 5 TABLET ORAL at 10:05

## 2017-12-05 RX ADMIN — Medication 0.25 MILLIGRAM(S): at 06:12

## 2017-12-05 RX ADMIN — Medication 4 MILLIGRAM(S): at 16:24

## 2017-12-05 RX ADMIN — OXYCODONE HYDROCHLORIDE 5 MILLIGRAM(S): 5 TABLET ORAL at 11:00

## 2017-12-05 RX ADMIN — PANTOPRAZOLE SODIUM 40 MILLIGRAM(S): 20 TABLET, DELAYED RELEASE ORAL at 06:39

## 2017-12-05 RX ADMIN — SERTRALINE 50 MILLIGRAM(S): 25 TABLET, FILM COATED ORAL at 11:19

## 2017-12-05 RX ADMIN — Medication 4 MILLIGRAM(S): at 05:57

## 2017-12-05 RX ADMIN — Medication 1 DROP(S): at 06:01

## 2017-12-05 RX ADMIN — INFLUENZA VIRUS VACCINE 0.5 MILLILITER(S): 15; 15; 15; 15 SUSPENSION INTRAMUSCULAR at 15:01

## 2017-12-05 RX ADMIN — Medication 50 MILLIGRAM(S): at 05:57

## 2017-12-05 RX ADMIN — ENOXAPARIN SODIUM 40 MILLIGRAM(S): 100 INJECTION SUBCUTANEOUS at 11:19

## 2017-12-05 NOTE — PROGRESS NOTE ADULT - SUBJECTIVE AND OBJECTIVE BOX
HPI: Pt seen and examined this am in follow up for sx. Pt endorses mod pain in back, wants dose of pain medication. Pt denies any other complaints. Aware of plan to likely go home today, looking forward to this.       ROS:    All other systems reviewed and negative      PHYSICAL EXAM:    Vital Signs Last 24 Hrs  T(C): 36.6 (05 Dec 2017 05:59), Max: 36.8 (04 Dec 2017 21:03)  T(F): 97.9 (05 Dec 2017 05:59), Max: 98.3 (04 Dec 2017 21:03)  HR: 73 (05 Dec 2017 05:59) (73 - 85)  BP: 166/69 (05 Dec 2017 05:59) (125/57 - 166/69)  BP(mean): --  RR: 18 (05 Dec 2017 05:59) (17 - 18)  SpO2: 98% (05 Dec 2017 05:59) (96% - 98%)  Daily     Daily     PPSV2:  30 %  FAST: 4    General: Elderly female, thin, sitting up in bed, pleasant, NAD  Mental Status: AOx3   HEENT: dmm, perrl, eomi, some temporal wasting  Lungs: dec at bases  Cardiac: +s1 s2 rrr  GI: soft nt nd +bs  : voids independent  Ext: no edema  Neuro: 4/5 strength LE    LABS:            Albumin: Albumin, Serum: 2.5 g/dL (11-29 @ 06:29)      Allergies    erythromycin (Unknown)  latex (Rash)    Intolerances      MEDICATIONS  (STANDING):  artificial  tears Solution 1 Drop(s) Both EYES two times a day  dexamethasone     Tablet 4 milliGRAM(s) Oral two times a day  enoxaparin Injectable 40 milliGRAM(s) SubCutaneous every 24 hours  HYDROmorphone  Injectable 0.5 milliGRAM(s) IV Push once  influenza   Vaccine 0.5 milliLiter(s) IntraMuscular once  levothyroxine 112 MICROGram(s) Oral daily  metoprolol succinate ER 50 milliGRAM(s) Oral daily  pantoprazole    Tablet 40 milliGRAM(s) Oral two times a day before meals  sertraline 50 milliGRAM(s) Oral daily    MEDICATIONS  (PRN):  ALPRAZolam 0.25 milliGRAM(s) Oral two times a day PRN anxiety  oxyCODONE    IR 5 milliGRAM(s) Oral every 4 hours PRN Severe Pain (7 - 10)      RADIOLOGY:

## 2017-12-05 NOTE — PROGRESS NOTE ADULT - ASSESSMENT
76y old Female coming from home with hx of vascular dementia (mild, poor historian), metastatic lung cancer NSCLC dx 2 years ago treated with chemoradiation (Dr. Warren, Dr. Calderón, on Alimta +carbo, changed to Opdivo 1 week prior to admission), with subsequent progression of disease requiring palliative RT admitted 11/28 for recurrent falls. Found to have MRI showing   numerous osseous metastasis throughout spine and MRI Head showing likely osseous mets to cranium and old ischemia. Palliative Care consulted to assist with establishing GOC.    1) Pain  - 2/2 to widely metastatic bone mets  - agree with oxycodone IR 5mg q4h prn (will dc percocet, team unaware that pain over weekend was due to not receiving this, as opposed to having pain despite this.   - c/w decadron as this can be helpful for bony pain  - aware of rad onc impression and likely that RT will no longer be useful    2) AMS/anxiety  - hx of dementia  - pt unable to keep track of all details, but seems somewhat aware of her prognosis  - agree with addition of xanax 0.25 prn    3) debility  - PpS waning for some time, now<40%  - some evidence of inability to maintain adequate diet due to albumin 2.5 and some muscle wasting  - increasing weakness, needing help with all ADLs here  - PT consult pending    4) Metastatic NSCLC  - widely metastatic to bones and also to liver  - oncology and rad onc notes appreciated  - already on 2nd line palliative therapy  - Dr. Warren to follow up, but family already deciding on home hospice  - as per nursing and Dr. Turcios, discussion held with pt and Dr. Calderón with no further palliative RT being offered    5) Prognosis  - poor  - in light of metastatic lung cancer with progression despite chemo, already on second line, and also progressing despite RT, with waning PPS. Pt fits criteria for hospice.     6) GOC/Advanced directives  - pt seems to have capacity for decision-making, as she is able to demonstrate understanding of prognosis, and manipulate information to make big decisions with help of her  about her care, with them coming to decisions on advanced directives together  -  Farzad noted as HCP  ([phone]/ [phone]), will bring in form  - DNR and DNI, comfort MOLST on chart  - GOC meeting held 12/1, readdressed today- family now wanting home hospice with VNS, referral being sent by SW, hoping for dc as soon as equipment in place.    Thank you for including us in Mrs. Blum's care. Will continue to follow with you.    Narinder Sen MD  Palliative Care Attending

## 2017-12-05 NOTE — PROGRESS NOTE ADULT - PROVIDER SPECIALTY LIST ADULT
Hospitalist
Palliative Care

## 2017-12-05 NOTE — PROGRESS NOTE ADULT - SUBJECTIVE AND OBJECTIVE BOX
76-year-old female pmh lung ca with bony mets treated with palliative chemo/RTX with alimta and carboplatin recently switched to opdivo, HTN ,anemia, dementia p/w  with recurrent fall at home , no back pain patient no any urinary incontinence and stool incontinence and has been able to intermittently walk and bear weight on the left lower extremity.        : MRI c/w bony mets to skull, CT c/w hepatic mets. Pt pain uncontrolled.   :  at bedside, he is aware of all of the new findings and they both would like to pursue home hospice but are eagerly awaiting Dr Warren's opinion  12/3: pain well controlled today with modified pain schedule  : pain controlled  : plan for dc today. No other issues     REVIEW OF SYSTEMS: All other review of systems is negative unless indicated above.    ICU Vital Signs Last 24 Hrs  T(C): 36.6 (05 Dec 2017 05:59), Max: 36.8 (04 Dec 2017 21:03)  T(F): 97.9 (05 Dec 2017 05:59), Max: 98.3 (04 Dec 2017 21:03)  HR: 73 (05 Dec 2017 05:59) (73 - 85)  BP: 166/69 (05 Dec 2017 05:59) (125/57 - 166/69)  BP(mean): --  ABP: --  ABP(mean): --  RR: 18 (05 Dec 2017 05:59) (17 - 18)  SpO2: 98% (05 Dec 2017 05:59) (96% - 98%)            GENERAL: comfortable, confused  HEAD:  Atraumatic, Normocephalic  EYES: EOMI, PERRLA, conjunctiva and sclera clear  HEENT: Moist mucous membranes  NECK: Supple, No JVD  NERVOUS SYSTEM:  Alert & Oriented X3,  CHEST/LUNG: Clear to auscultation bilaterally; No rales, rhonchi, wheezing, or rubs  HEART:S1S2 normal, no murmur  ABDOMEN: Soft, Nontender, Nondistended; Bowel sounds present  GENITOURINARY- Voiding, no palpable bladder  EXTREMITIES:  2+ Peripheral Pulses, No clubbing, cyanosis, or edema  MUSCULOSKELETAL No muscle tenderness, Muscle tone normal, No joint tenderness, no Joint swelling,  SKIN-no rash, no lesion  PSYCH- Mood stable  LYMPH Node- No palpable lymph node    MEDICATIONS  (STANDING):  dexamethasone  Injectable 4 milliGRAM(s) IV Push every 6 hours  enoxaparin Injectable 40 milliGRAM(s) SubCutaneous every 24 hours  HYDROmorphone  Injectable 0.5 milliGRAM(s) IV Push once  influenza   Vaccine 0.5 milliLiter(s) IntraMuscular once  levothyroxine 112 MICROGram(s) Oral daily  metoprolol succinate ER 50 milliGRAM(s) Oral daily  pantoprazole    Tablet 40 milliGRAM(s) Oral two times a day before meals  sertraline 50 milliGRAM(s) Oral daily  sodium chloride 0.9%. 1000 milliLiter(s) (50 mL/Hr) IV Continuous <Continuous>    MEDICATIONS  (PRN):  oxyCODONE    IR 5 milliGRAM(s) Oral every 6 hours PRN Severe Pain (7 - 10)                              7.5    4.7   )-----------( 308      ( 2017 17:16 )             22.0         135  |  100  |  8   ----------------------------<  99  4.0   |  29  |  0.62    Ca    8.8      2017 06:29  Mg     1.5         TPro  6.7  /  Alb  2.5<L>  /  TBili  0.3  /  DBili  x   /  AST  19  /  ALT  13  /  AlkPhos  142<H>      CAPILLARY BLOOD GLUCOSE        LIVER FUNCTIONS - ( 2017 06:29 )  Alb: 2.5 g/dL / Pro: 6.7 gm/dL / ALK PHOS: 142 U/L / ALT: 13 U/L / AST: 19 U/L / GGT: x             Urinalysis Basic - ( 2017 00:01 )    Color: Yellow / Appearance: Clear / S.015 / pH: x  Gluc: x / Ketone: Negative  / Bili: Negative / Urobili: Negative mg/dL   Blood: x / Protein: 15 mg/dL / Nitrite: Negative   Leuk Esterase: Small / RBC: 0-2 /HPF / WBC 6-10   Sq Epi: x / Non Sq Epi: Few / Bacteria: Few        Assessment and Plan:  76 year old admitted for recurrent fall and leg weakness found to have metastatic lung ca to entire spine, skull and liver    Recurrent fall secondary to bony mets/primary lung CA  - MRI --> mets to entire spine with compression fx of T4, T9, L1. Ossesous mets to liver and skull  - Fits criteria for hospice. Per Dr Calderón no further therapy planned. -  - Plan for home hospice. Case d/w Dr Warren   - pain control-  oxy IR 5mg q6prn  - neurology evaluation appreciated --> no cord compression  - supportive care with steroids  taper over next 6 days  - opioid therapy      #Anemia: Chronic disease  -monitor    #lung cancer with metastatic disease to bone  -f/u oncology concerning management --> as above  -rad-onc --> no further tx offered at this time.  Prognosis poor.      dvt ppx:  -lovenox    Attending Statement:  35 minutes spent on total encounter; more than 50% of the visit was spent counseling and/or coordinating care by the attending physician.

## 2017-12-05 NOTE — CHART NOTE - NSCHARTNOTEFT_GEN_A_CORE
Palliative Medicine Discharge Summary    **This is a summary of the conversations and decisions you have made with the support of the Palliative Care Team**    Family Meeting Held: 12/1  Advance Directives:   -Health Care Proxy: Farzad Blum  -Code Status: Do Not Resuscitate and Do Not Intubate    Goals of Care/Preferences for Treatment:  We met with you and your family to talk about your diagnosis and how this will effect your life and what is important to you. You and your  both understand the progression of your cancer and that further treatment of this will not serve to improve your quality of life or prognosis. You thus agreed with the recommendation to take advantage of hospice services that would allow you to be as comfortable as possible, ensure your symptoms would be properly treated, and that your family would be fully supported. We discussed how hope is never lost, but rather changed to encompass a new goal that you will be able to share as much meaningful time with your loving family as possible, with the full support of a compassionate and experienced clinical team to guide you.     Discharge Plan:    Plan is to go home today, with these services in place via [facility]. They will provide you with equipment, medication, doctors, nursing, social workers, chaplains, bereavement specialists, volunteers, aides and backup phone numbers to call (24/7) if you should need extra help. You can expect that you will continue to have the best care and respect for your right to dignity and to be without suffering.     It has been a pleasure to get to know you and your family. We wish you all the best.    Narinder Sen MD  Palliative Care Attending

## 2017-12-05 NOTE — PROGRESS NOTE ADULT - NSHPATTENDINGPLANDISCUSS_GEN_ALL_CORE
Palliaitve/Oncology/family/patient/nursing
Palliaitve/Oncology/family/patient/nursing
Palliaitve/family/patient/nursing

## 2017-12-11 DIAGNOSIS — Z85.118 PERSONAL HISTORY OF OTHER MALIGNANT NEOPLASM OF BRONCHUS AND LUNG: ICD-10-CM

## 2017-12-11 DIAGNOSIS — E53.8 DEFICIENCY OF OTHER SPECIFIED B GROUP VITAMINS: ICD-10-CM

## 2017-12-11 DIAGNOSIS — C78.7 SECONDARY MALIGNANT NEOPLASM OF LIVER AND INTRAHEPATIC BILE DUCT: ICD-10-CM

## 2017-12-11 DIAGNOSIS — F41.9 ANXIETY DISORDER, UNSPECIFIED: ICD-10-CM

## 2017-12-11 DIAGNOSIS — M81.0 AGE-RELATED OSTEOPOROSIS WITHOUT CURRENT PATHOLOGICAL FRACTURE: ICD-10-CM

## 2017-12-11 DIAGNOSIS — F01.50 VASCULAR DEMENTIA WITHOUT BEHAVIORAL DISTURBANCE: ICD-10-CM

## 2017-12-11 DIAGNOSIS — R53.1 WEAKNESS: ICD-10-CM

## 2017-12-11 DIAGNOSIS — Z66 DO NOT RESUSCITATE: ICD-10-CM

## 2017-12-11 DIAGNOSIS — C79.51 SECONDARY MALIGNANT NEOPLASM OF BONE: ICD-10-CM

## 2017-12-11 DIAGNOSIS — C34.90 MALIGNANT NEOPLASM OF UNSPECIFIED PART OF UNSPECIFIED BRONCHUS OR LUNG: ICD-10-CM

## 2017-12-11 DIAGNOSIS — E03.9 HYPOTHYROIDISM, UNSPECIFIED: ICD-10-CM

## 2017-12-11 DIAGNOSIS — I10 ESSENTIAL (PRIMARY) HYPERTENSION: ICD-10-CM

## 2017-12-11 DIAGNOSIS — D63.0 ANEMIA IN NEOPLASTIC DISEASE: ICD-10-CM

## 2017-12-11 DIAGNOSIS — Z91.81 HISTORY OF FALLING: ICD-10-CM

## 2018-07-05 NOTE — PROGRESS NOTE ADULT - SUBJECTIVE AND OBJECTIVE BOX
----- Message from Ashley Monson sent at 7/5/2018 11:02 AM CDT -----  Contact: Self/ [phone]  Patient called in requesting to speak with you. Patient prefers to speak with a nurse.     Please call and advise.     HPI: Mrs. Blum is a 76y old Female coming from home with hx of vascular dementia (mild, poor historian), metastatic lung cancer NSCLC dx 2 years ago treated with chemoradiation (Dr. Warren, Dr. Calderón, on Alimta +carbo, changed to Opdivo 1 week prior to admission), with subsequent progression of disease requiring palliative RT admitted 11/28 for recurrent falls. Found to have MRI showing numerous osseous metastasis throughout spine and MRI Head showing likely osseous mets to cranium and old ischemia.    12/2/17 Seen and examined at bedside with no family present. Disoriented to time and place. Restless at times. Denies pain and dyspnea    PAIN: (x )Yes   ( )No  Level: moderate  Location: left deltoid area   Intensity:   7/10  Quality: aching  Aggravating Factors: movement  Alleviating Factors: pain meds  Radiation: sometimes  Duration/Timing: intermittent  Impact on ADLs: yes    DYSPNEA: ( ) Yes  (x ) No  Level:    PAST MEDICAL & SURGICAL HISTORY:  Vitamin B12 deficiency  Intervertebral disc disorder: S/P cervical fusion  Osteoporosis  Lung cancer  Macular degeneration: left eye  Hypothyroidism, unspecified type  HTN (hypertension)  History of facial surgery: right cheekbone surgery - 06/2017  History of lung surgery: right lung nodules removed 2015  H/O umbilical hernia repair  S/P breast lumpectomy  H/O spinal fusion: anterior cervical fusion 2x   15 yrs ago, 2014  H/O oophorectomy: Right side      SOCIAL HX: Lives with ,  55 years, 3 children (one estranged), 11 grandchildren    Hx opiate tolerance (x )YES  ( )NO    Baseline ADLs  (Prior to Admission)- now dependent   (x ) Independent   ( )Dependent    FAMILY HISTORY:  No pertinent family history in first degree relatives      Review of Systems:    Anxiety- with certain conversations, otherwise ok  Depression- denies  Constipation- resolved  Diarrhea- resolved  Nausea- no  Vomiting- no  Anorexia- yes, thinks appetite picking up some  Weight Loss- unable to recall  Cough- denies  Secretions- denies  Fatigue- yes  Weakness- yes  Delirium- yes, at times    All other systems reviewed and negative      PHYSICAL EXAM:      ICU Vital Signs Last 24 Hrs  T(C): 36.4 (02 Dec 2017 04:49), Max: 36.8 (01 Dec 2017 21:02)  T(F): 97.5 (02 Dec 2017 04:49), Max: 98.2 (01 Dec 2017 21:02)  HR: 71 (02 Dec 2017 04:49) (71 - 87)  BP: 147/69 (02 Dec 2017 04:49) (121/61 - 147/69)  BP(mean): --  ABP: --  ABP(mean): --  RR: 16 (02 Dec 2017 04:49) (16 - 16)  SpO2: 97% (02 Dec 2017 04:49) (97% - 97%)  PPSV2:  30 %  FAST: 4    General: Elderly female, thin,  in bed, pleasant, disoriented to time and place  Mental Status: AOx1  HEENT: dmm, perrl, eomi, some temporal wasting  Lungs: dec at bases  Cardiac: +s1 s2 rrr  GI: soft nt nd +bs  : voids independent  Ext: no edema  Neuro: 4/5 strength LE      LABS:                          7.5    4.7   )-----------( 308      ( 29 Nov 2017 17:16 )             22.0             Albumin: Albumin, Serum: 2.5 g/dL (11-29 @ 06:29)      Allergies    erythromycin (Unknown)  latex (Rash)    Intolerances          RADIOLOGY/ADDITIONAL STUDIES:      EXAM:  BRAIN (MRI) W O CON                        PROCEDURE DATE:  11/29/2017    INTERPRETATION:    MR brain  without gadolinium     IMPRESSION:   Mild periventricular, bifrontal and biparietal subcortical   and deep white matter ischemia. Old lacunar infarction seen in the LEFT   thalamus posteriorly and in the BILATERAL cerebellum.  Retention cysts   are seen in the RIGHT maxillary sinus.  2 cm hypoechoic lesion in the   LEFT parietal bone suspicious for osseous metastases.      SASHA SNEED M.D., ATTENDING RADIOLOGIST  This document has been electronically signed. Nov 30 2017  8:45AM      EXAM:  THORACIC SPINE(MRI)W O CON                        EXAM:  MR SPINE LUMBAR                        PROCEDURE DATE:  11/29/2017      INTERPRETATION:  Exam Date: 11/28/2017       IMPRESSION:      Multilevel multifocal metastases of the thoracic and lumbar spine and   bony pelvis.  Mild inferior endplate height loss at T9, may reflect   degenerative change versus mild compression fracture. Mild superior   endplate height loss at L1, most suggestive of a mild pathologic   compression fracture. No significant spinal canal stenosis of the   thoracic lumbar spine.    Mass lesion in the rightlower hilar region, compatible with patient's   known primary malignancy, is again identified, better evaluated on the   recent CT chest.    Numerous T2 hyperintense lesions within the liver, most suggestive of   hepatic metastases.        KELLEE BARBOSA M.D., ATTENDING RADIOLOGIST  This document has been electronically signed. Nov 29 2017 11:42AM

## 2019-12-09 NOTE — ED ADULT NURSE NOTE - ALCOHOL PRE SCREEN (AUDIT - C)
Prep Survey      Responses   Facility patient discharged from?  [facility]   Is patient eligible?  Yes   Discharge diagnosis  lap low anterior resection with loop ileostomy,  Hx rectal CA, finished chemo   Does the patient have one of the following disease processes/diagnoses(primary or secondary)?  General Surgery   Does the patient have Home health ordered?  Yes   What is the Home health agency?   [facility] for new ostomy   Is there a DME ordered?  No   Prep survey completed?  Yes          Nikole Huber RN        
Statement Selected

## 2022-04-12 NOTE — ASU PATIENT PROFILE, ADULT - MEDICATION HERBAL REMEDIES, PROFILE
Detail Level: Detailed Hide Anesthesia Volume?: No Validate Note Data (See Information Below): Yes Notification Instructions: Patient will be notified of biopsy results. However, patient instructed to call the office if not contacted within 2 weeks. Hemostasis: Electrocautery Size Of Lesion In Cm: 1 Electrodesiccation And Curettage Text: The wound bed was treated with electrodesiccation and curettage after the biopsy was performed. Biopsy Type: H and E Anesthesia Type: 1% lidocaine with epinephrine X Size Of Lesion In Cm: 0 Biopsy Method: 15 blade Billing Type: [employer] no Silver Nitrate Text: The wound bed was treated with silver nitrate after the biopsy was performed. Wound Care: Vaseline Type Of Destruction Used: Curettage Curettage Text: The wound bed was treated with curettage after the biopsy was performed. Electrodesiccation Text: The wound bed was treated with electrodesiccation after the biopsy was performed. Consent was obtained and risks were reviewed including but not limited to scarring, infection, bleeding, scabbing, incomplete removal, nerve damage and allergy to anesthesia. Anesthesia Volume In Cc (Will Not Render If 0): 0.5 Cryotherapy Text: The wound bed was treated with cryotherapy after the biopsy was performed. Depth Of Biopsy: dermis Dressing: bandage Information: Selecting Yes will display possible errors in your note based on the variables you have selected. This validation is only offered as a suggestion for you. PLEASE NOTE THAT THE VALIDATION TEXT WILL BE REMOVED WHEN YOU FINALIZE YOUR NOTE. IF YOU WANT TO FAX A PRELIMINARY NOTE YOU WILL NEED TO TOGGLE THIS TO 'NO' IF YOU DO NOT WANT IT IN YOUR FAXED NOTE. Post-care instructions reviewed with the patient in detail. The patient is to keep the biopsy site dry overnight. Remove the bandage and shower as normal with soap and water, dry the area, apply vaseline and a band-aid. Repeat this process daily for five days.

## 2024-09-13 NOTE — ED ADULT TRIAGE NOTE - NS ED NURSE DIRECT TO ROOM YN
Yes
[facility]  [address] [place] [address], [address]  [place] [address]  Phone: [phone]  Fax: [phone]  Follow Up Time: 1 week

## 2024-10-10 NOTE — ASU DISCHARGE PLAN (ADULT/PEDIATRIC). - FOR NEXT 12 HOURS DO NOT:
"Labs today.    \" Drug holiday\" for one year.    Follow-up in 1 year with DXA scan.      "
Statement Selected

## 2025-01-10 NOTE — ED ADULT TRIAGE NOTE - CHIEF COMPLAINT QUOTE
LISETTE HAMPTON ROSANNA    Patient Age: 73 year old   Refill request by: Phone.  Patient wants a call back? No  Refill to be: ePrescribed to [facility]    Medication requested to be refilled:   Timolol        WEIGHT AND HEIGHT:   Wt Readings from Last 1 Encounters:   No data found for Wt     Ht Readings from Last 1 Encounters:   No data found for Ht     BMI Readings from Last 1 Encounters:   No data found for BMI       ALLERGIES:  Patient has no known allergies.  Current Outpatient Medications   Medication Sig Dispense Refill    timolol (TIMOPTIC) 0.5 % ophthalmic solution INSTILL 1 DROP IN BOTH EYES DAILY 15 mL 1     No current facility-administered medications for this visit.     PHARMACY to use: [id number] - [place] [id number] AT [place]          Pharmacy preference(s) on file:   [facility][id number] - [facility] [address] AT [facility]  [address]  [facility] [address]  Phone: [phone] Fax: [phone]      CALL BACK INFO: Ok to leave response (including medical information) on answering machine  ROUTING: Patient's physician/staff        PCP: Placido Bazan DO         INS: Payor: [employer]VANTAGE / Plan: [employer] MEDICARE ADVANTAGE PPO / Product Type: MEDICARE   PATIENT ADDRESS:  [address] [place] [id number]    Chest pain x 30 min. ASA and 1 nitro given with relief.

## 2025-04-09 NOTE — ASU PATIENT PROFILE, ADULT - PAIN SCALE PREFERRED, PROFILE
Writer spoke to patient in regards to procedure tomorrow, all questions answered and agreeable to plan.   
numerical 0-10